# Patient Record
(demographics unavailable — no encounter records)

---

## 2017-01-22 NOTE — ER DOCUMENT REPORT
ED General





- General


Chief Complaint: High Blood Pressure


Stated Complaint: POSSIBLE HIGH BLOOD PRESSURE


Time seen by provider: 01:44


Notes: 


Patient is a 52-year-old male that comes emergency department for chief 

complaint of elevated blood pressure.  Patient states that his stomach upset, 

he vomited, he felt unwell, he checked his blood pressure noticed it was about 

170/100.  He states became concerned and came emergency department.  He denies 

any current symptoms including headache, chest pain, abdominal pain.  He states 

he feels fine and wants go home.  Patient states that he only got sick to the 

stomach after he took the for antibiotics that he is on at the same time.  

Patient is on antibiotics for his left foot which he is about to begin 

hyperbaric treatments for.  Patient denies any fevers or chills.


TRAVEL OUTSIDE OF THE [place] IN LAST 30 DAYS: No





- Related Data


Allergies/Adverse Reactions: 


 





No Known Allergies Allergy (Verified 01/21/17 23:40)


 











Past Medical History





- General


Information source: Patient





- Social History


Smoking Status: Current Every Day Smoker


Frequency of alcohol use: None


Drug Abuse: None


Lives with: Family


Family History: Reviewed & Not Pertinent, CAD, COPD, DM


Patient has suicidal ideation: No


Patient has homicidal ideation: No





- Past Medical History


Cardiac Medical History: Reports: Hx Hypertension


Endocrine Medical History: Reports: Hx Diabetes Mellitus Type 2 - He has 

diabetic retinopathy and nephropathy.  There is proteinuria


Renal/ Medical History: Denies: Hx Peritoneal Dialysis


Psychiatric Medical History: 


   Denies: Hx Depression





- Immunizations


Hx Diphtheria, Pertussis, Tetanus Vaccination: Yes - unkown of date





Review of Systems





- Review of Systems


Constitutional: No symptoms reported


EENT: No symptoms reported


Cardiovascular: See HPI


Respiratory: No symptoms reported


Gastrointestinal: See HPI


Genitourinary: No symptoms reported


Male Genitourinary: No symptoms reported


Musculoskeletal: No symptoms reported


Skin: No symptoms reported


Hematologic/Lymphatic: No symptoms reported


Neurological/Psychological: No symptoms reported





Physical Exam





- Vital signs


Vitals: 


 











Temp Pulse Resp BP Pulse Ox


 


 97.9 F   91   16   158/79 H  98 


 


 01/21/17 23:37  01/21/17 23:37  01/21/17 23:37  01/21/17 23:37  01/21/17 23:37











Interpretation: Normal





- General


General appearance: Appears well, Alert


In distress: None





- HEENT


Head: Normocephalic, Atraumatic


Eyes: Normal


Pupils: PERRL





- Respiratory


Respiratory status: No respiratory distress


Chest status: Nontender


Breath sounds: Normal.  No: Decreased air movement, Wheezing


Chest palpation: Normal





- Cardiovascular


Rhythm: Regular.  No: Tachycardia


Heart sounds: Normal auscultation, S1 appreciated, S2 appreciated


Murmur: No





- Abdominal


Inspection: Normal


Distension: No distension


Bowel sounds: Normal


Tenderness: Nontender


Organomegaly: No organomegaly





- Back


Back: Normal, Nontender





- Extremities


General upper extremity: Normal inspection, Nontender, Normal color, Normal ROM

, Normal temperature


General lower extremity: Other - Patient has a walking boot on his left foot.  

Otherwise unremarkable exam





- Neurological


Neuro grossly intact: Yes


Cognition: Normal


Orientation: AAOx4


[place] Coma Scale Eye Opening: Spontaneous


[place] Coma Scale Verbal: Oriented


[place] Coma Scale Motor: Obeys Commands


[place] Coma Scale Total: 15


Speech: Normal


Motor strength normal: LUE, RUE, LLE, RLE


Sensory: Normal





- Psychological


Associated symptoms: Normal affect, Normal mood





- Skin


Skin Temperature: Warm


Skin Moisture: Dry


Skin Color: Normal





Course





- Re-evaluation


Re-evalutation: 


Patient with asymptomatic hypertension, very well appearing, patient is 

declining any additional evaluation regardless.  No emergent evaluation needed, 

patient is to continue his daily medications and follow-up closely with his 

primary care provider.  Discussed return precautions.  Patient states 

understanding and agreement.





- Vital Signs


Vital signs: 


 











Temp Pulse Resp BP Pulse Ox


 


 97.9 F   83   16   149/81 H  98 


 


 01/21/17 23:37  01/22/17 01:58  01/21/17 23:37  01/22/17 01:58  01/21/17 23:37














Discharge





- Discharge


Clinical Impression: 


 Essential hypertension, Non healing left heel wound





Vomiting


Qualifiers:


 Vomiting type: unspecified Vomiting Intractability: non-intractable Nausea 

presence: with nausea Qualified Code(s): R11.2 - Nausea with vomiting, 

unspecified





Condition: Stable


Disposition: HOME, SELF-CARE


Additional Instructions: 


Continue your antibiotics.


Take your blood pressure medication as prescribed.


Follow-up with your primary care provider.


Return to the emergency department for any concerning symptoms - chest pain, 

numbness or weakness, dizziness, etc.


Forms:  Return to Work, Elevated Blood Pressure


Referrals: 


CATHI FARMER MD [Primary Care Provider] - Follow up as needed

## 2017-01-29 NOTE — ER DOCUMENT REPORT
ED Extremity Problem, Lower





- General


Chief Complaint: Other


Stated Complaint: LEFT FOOT/CAST PROBLEM


Time seen by provider: 22:46


Mode of Arrival: Ambulatory


Information source: Patient


TRAVEL OUTSIDE OF THE [place] IN LAST 30 DAYS: No





- HPI


Patient complains to provider of: Other - Requesting cast removal


Location: Foot


Occurred: Just prior to arrival


Where: Home


Onset/Duration: Sudden


Quality of pain: Achy, Throbbing


Severity: Moderate


Pain Level: 2


Context: Recent immobilization


Recent injury: No


Exacerbated by: Nothing


Relieved by: Nothing


Notes: 


Patient is a 52-year-old male with history of a chronic nonhealing ulcer to his 

left heel, he is seen by the wound care clinic and recently had a cast placed 

on this foot, unfortunately he states he got it wet in the shower this evening 

and feels as though the cast tightening around his ankle and causing his toes 

to tingle, patient denies any other injury or pain





- Related Data


Allergies/Adverse Reactions: 


 





No Known Allergies Allergy (Verified 01/21/17 23:40)


 











Past Medical History





- General


Information source: Patient





- Social History


Smoking Status: Current Every Day Smoker


Chew tobacco use (# tins/day): No


Frequency of alcohol use: None


Drug Abuse: None


Family History: Reviewed & Not Pertinent, CAD, COPD, DM


Patient has suicidal ideation: No


Patient has homicidal ideation: No





- Past Medical History


Cardiac Medical History: Reports: Hx Hypertension


Endocrine Medical History: Reports: Hx Diabetes Mellitus Type 2 - He has 

diabetic retinopathy and nephropathy.  There is proteinuria


Renal/ Medical History: Denies: Hx Peritoneal Dialysis


Psychiatric Medical History: 


   Denies: Hx Depression





- Immunizations


Hx Diphtheria, Pertussis, Tetanus Vaccination: Yes - unkown of date





Review of Systems





- Review of Systems


Constitutional: No symptoms reported


EENT: No symptoms reported


Cardiovascular: No symptoms reported


Respiratory: No symptoms reported


Gastrointestinal: No symptoms reported


Genitourinary: No symptoms reported


Male Genitourinary: No symptoms reported


Musculoskeletal: See HPI


Skin: No symptoms reported


Hematologic/Lymphatic: No symptoms reported


Neurological/Psychological: No symptoms reported


-: Yes All other systems reviewed and negative





Physical Exam





- Vital signs


Vitals: 


 











Temp Pulse Resp BP Pulse Ox


 


 97.4 F   92   18   169/72 H  99 


 


 01/29/17 20:58  01/29/17 20:58  01/29/17 20:58  01/29/17 20:58  01/29/17 20:58











Interpretation: Normal





- Notes


Notes: 


- General


General appearance: Appears well, Alert


In distress: None





- HEENT


Head: Normocephalic, Atraumatic


Eyes: Normal


Conjunctiva: Normal


Extraocular movements intact: Yes


Eyelashes: Normal


Pupils: PERRL





- Respiratory


Respiratory status: No respiratory distress





- Cardiovascular


Rhythm: Regular





- Abdominal


Inspection: Normal





- Back


Back: Normal





- Extremities


General upper extremity: Normal inspection


General lower extremity: Cast in place on the left lower extremity which goes 

from just below the knee and covers the toes completely





- Neurological


Neuro grossly intact: Yes


Orientation: AAOx4


[place] Coma Scale Eye Opening: Spontaneous


[place] Coma Scale Verbal: Oriented


[place] Coma Scale Motor: Obeys Commands


[place] Coma Scale Total: 15





- Psychological


Associated symptoms: Normal affect, Normal mood





- Skin


Skin Temperature: Warm


Skin Moisture: Dry


Skin Color: Normal





Course





- Re-evaluation


Re-evalutation: 


01/29/17 22:46


Patient was discussed with podiatrist Dr. Smith from the wound care clinic, who 

is in agreement with removing patient's cast, he requested Xeroform and gauze 

dressing with Kerlix, have patient wear his postop shoe and follow-up at the 

wound care center in the morning





01/30/17 23:38


After cast was removed neurovascular evaluation was performed, patient has 

distal sensation and motor intact, brisk capillary refill, 2+ DP pulses, there 

is a large ulcer to the heel which was redressed, patient was advised to follow-

up at the wound care center in the morning, states he actually has an 

appointment for hyperbaric treatment





- Vital Signs


Vital signs: 


 











Temp Pulse Resp BP Pulse Ox


 


 97.6 F   84   18   162/97 H  98 


 


 01/29/17 23:24  01/29/17 23:24  01/29/17 23:24  01/29/17 23:24  01/29/17 23:24














Procedures





- Additional Procedures


  ** cast removal


Time performed: 23:04


Additional Procedures: Other - Cast on the left lower extremity was bivalved 

using the cast removal saw, removed without difficulty, it did appear to be 

quite wet inside





Discharge





- Discharge


Clinical Impression: 


 Cast removal





Condition: Stable


Disposition: HOME, SELF-CARE


Additional Instructions: 


Follow up with the wound care clinic first thing tomorrow morning.  Return if 

symptoms worsen or any additional concerns.

## 2017-01-29 NOTE — ER DOCUMENT REPORT
ED Medical Screen (RME)





- General


Stated Complaint: LEFT FOOT/CAST PROBLEM


Notes: 


Cast to left lower extremity Wet shower patient's provider advised to come to 

the emergency room for removal.


I greeted and performed a rapid initial assessment of this patient. 

Comprehensive ED assessment and evaluation of the patient, analysis of test 

results and completion of the medical decision making process will be conducted 

by additional ED providers.


TRAVEL OUTSIDE OF THE [place] IN LAST 30 DAYS: No





- Related Data


Allergies/Adverse Reactions: 


 





No Known Allergies Allergy (Verified 01/21/17 23:40)


 











Past Medical History





- Past Medical History


Cardiac Medical History: Reports: Hx Hypertension


Endocrine Medical History: Reports: Hx Diabetes Mellitus Type 2 - He has 

diabetic retinopathy and nephropathy.  There is proteinuria


Renal/ Medical History: Denies: Hx Peritoneal Dialysis


Psychiatric Medical History: 


   Denies: Hx Depression





- Immunizations


Hx Diphtheria, Pertussis, Tetanus Vaccination: Yes - unkown of date

## 2018-03-30 NOTE — RADIOLOGY REPORT (SQ)
EXAM DESCRIPTION:  TIBIA FIBULA LEFT



COMPLETED DATE/TIME:  3/30/2018 11:21 am



REASON FOR STUDY:  fall injury



COMPARISON:  None.



NUMBER OF VIEWS:  Two views.



TECHNIQUE:  Two radiographic images acquired of the left tibia and fibula to include the knee and ank
le in at least one projection.



LIMITATIONS:  None.



FINDINGS:  MINERALIZATION: Normal.

BONES: Bimalleolar fractures.  No proximal tibial or fibular fracture is seen.

SOFT TISSUES: No obvious swelling or foreign body.

OTHER: No other significant finding.



IMPRESSION:  Bimalleolar fractures.



TECHNICAL DOCUMENTATION:  JOB ID:  [id number]

 2011 [employer]- All Rights Reserved



Reading location - IP/workstation name: [email or web address]

## 2018-03-30 NOTE — RADIOLOGY REPORT (SQ)
EXAM DESCRIPTION:  FOOT LEFT COMPLETE



COMPLETED DATE/TIME:  3/30/2018 11:21 am



REASON FOR STUDY:  fall injury



COMPARISON:  None.



NUMBER OF VIEWS:  Three views.



TECHNIQUE:  AP, lateral and oblique  radiographic images acquired of the left foot.



LIMITATIONS:  None.



FINDINGS:  MINERALIZATION: Normal.

BONES: There is a fracture of the medial malleolus.  There is a fracture of the navicular in the sagi
ttal plane.

JOINTS: No effusions.

SOFT TISSUES: No soft tissue swelling.  No foreign body.

OTHER: No other significant finding.



IMPRESSION:  Medial malleolar and navicular fractures.



TECHNICAL DOCUMENTATION:  JOB ID:  [id number]

 2011 [employer]- All Rights Reserved



Reading location - IP/workstation name: [email or web address]

## 2018-03-30 NOTE — ER DOCUMENT REPORT
ED Extremity Problem, Lower





- General


Chief Complaint: Ankle Injury


Stated Complaint: LEFT ANKLE INJURY


Time Seen by Provider: 03/30/18 10:16


Mode of Arrival: Ambulatory


Information source: Patient


Notes: 





54-year-old male presents to ED for complaint of pain to the left ankle.  He 

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 

freezer.  He states he is unable to walk on his foot.  He is in a wheelchair 

not able to walk at this time.  He states it happened just before he came.  He 

is speaking in full sentences.


TRAVEL OUTSIDE OF THE [place] IN LAST 30 DAYS: No





- HPI


Patient complains to provider of: Injury, Pain, Swelling


Location: Ankle, Foot


Occurred: Just prior to arrival


Where: Work


Onset/Duration: Sudden, Persistent


Quality of pain: Sharp, Throbbing


Severity: Severe


Pain Level: 5


Context: Fell, Twisted


Recent injury: Yes


Associated symptoms: Painful ambulation


Exacerbated by: Hanging down, Movement, Walking


Relieved by: Nothing





- Related Data


Allergies/Adverse Reactions: 


 





No Known Allergies Allergy (Verified 03/30/18 10:00)


 











Past Medical History





- General


Information source: Patient





- Social History


Smoking Status: Current Every Day Smoker


Cigarette use (# per day): Yes - One half pack per day


Chew tobacco use (# tins/day): No


Smoking Education Provided: Yes - 4 minutes


Frequency of alcohol use: None


Drug Abuse: None


Occupation: [employer]


Lives with: Family


Family History: CAD, COPD, DM, Hyperlipidemia, Hypertension.  denies: Arthritis

, CVA, Malignancy, Thyroid Disfunction


Patient has suicidal ideation: No


Patient has homicidal ideation: No





- Past Medical History


Cardiac Medical History: Reports: Hx Hypertension


Pulmonary Medical History: Reports: None


EENT Medical History: Reports: None


Neurological Medical History: Reports: None


Endocrine Medical History: Reports: Hx Diabetes Mellitus Type 2 - He has 

diabetic retinopathy and nephropathy.  There is proteinuria


Renal/ Medical History: Reports: Hx Renal Insufficiency


Malignancy Medical History: Reports None


GI Medical History: Reports: None


Musculoskeltal Medical History: Reports None


Skin Medical History: Reports None


Psychiatric Medical History: Reports: None


Traumatic Medical History: Reports: None


Infectious Medical History: Reports: None


Past Surgical History: Reports: Other - Dates he had surgery on his left heel 

for a bruised a car to be cold and it





- Immunizations


Hx Diphtheria, Pertussis, Tetanus Vaccination: Yes - unkown of date





Review of Systems





- Review of Systems


Constitutional: No symptoms reported


EENT: No symptoms reported


Cardiovascular: No symptoms reported


Respiratory: No symptoms reported


Gastrointestinal: No symptoms reported


Genitourinary: No symptoms reported


Male Genitourinary: No symptoms reported


Musculoskeletal: Muscle pain, Muscle stiffness, Ankle swelling, Other - Foot 

pain and swelling


Skin: No symptoms reported


Hematologic/Lymphatic: No symptoms reported


Neurological/Psychological: No symptoms reported


-: Yes All other systems reviewed and negative





Physical Exam





- Vital signs


Vitals: 


 











Temp Pulse Resp BP Pulse Ox


 


 98.8 F   85   18   158/73 H  99 


 


 03/30/18 10:01  03/30/18 10:01  03/30/18 10:01  03/30/18 10:01  03/30/18 10:01











Interpretation: Normal





- General


General appearance: Appears well, Alert





- HEENT


Head: Normocephalic, Atraumatic


Eyes: Normal


Pupils: PERRL





- Respiratory


Respiratory status: No respiratory distress


Chest status: Nontender


Breath sounds: Normal


Chest palpation: Normal





- Cardiovascular


Rhythm: Regular


Heart sounds: Normal auscultation


Murmur: No





- Abdominal


Inspection: Normal


Distension: No distension


Bowel sounds: Normal


Tenderness: Nontender


Organomegaly: No organomegaly





- Back


Back: Normal, Nontender





- Extremities


General upper extremity: Normal inspection, Nontender, Normal color, Normal ROM

, Normal temperature


General lower extremity: Normal temperature.  No: Gloria's sign


Calf: Tender


Ankle: Tender, Ecchymosis, Edema, Limited ROM, Unable to bear weight.  No: 

Abrasion, Deformity, Instability, Laceration, Positive Valentin's test


Foot: Tender





- Neurological


Neuro grossly intact: Yes


Cognition: Normal


Orientation: AAOx4


[place] Coma Scale Eye Opening: Spontaneous


[place] Coma Scale Verbal: Oriented


[place] Coma Scale Motor: Obeys Commands


[place] Coma Scale Total: 15


Speech: Normal


Motor strength normal: LUE, RUE, LLE, RLE


Sensory: Normal





- Psychological


Associated symptoms: Normal affect, Normal mood





- Skin


Skin Temperature: Warm


Skin Moisture: Dry


Skin Color: Normal





Course





- Re-evaluation


Re-evalutation: 





03/30/18 21:26


Patient's ankle was swollen and bruised painful decreased range of motion.  X-

ray shows a bimalleolar fracture with navicular fracture.  Consulted 

piramzadian with plan to use posterior and stirrup splint no weightbearing and 

crutches.  Dr. jack agreed with this plan.  I then consulted Dr. Mendiola 

to let him know of the injury.  He stated to make sure the patient understood 

no weightbearing and have him call the office first thing Monday morning if he 

did not call the day to schedule a follow-up appointment.





- Vital Signs


Vital signs: 


 











Temp Pulse Resp BP Pulse Ox


 


 97.8 F   75   18   179/81 H  99 


 


 03/30/18 12:27  03/30/18 12:27 03/30/18 12:27 03/30/18 12:27 03/30/18 12:27














- Diagnostic Test


Radiology reviewed: Image reviewed, Reports reviewed





Discharge





- Discharge


Clinical Impression: 


Bimalleolar ankle fracture


Qualifiers:


 Encounter type: initial encounter Fracture type: closed Laterality: left 

Qualified Code(s): S82.842A - Displaced bimalleolar fracture of left lower leg, 

initial encounter for closed fracture





Navicular fracture of ankle


Qualifiers:


 Encounter type: initial encounter Fracture type: closed Fracture alignment: 

nondisplaced Laterality: right Qualified Code(s): S92.254A - Nondisplaced 

fracture of navicular [scaphoid] of right foot, initial encounter for closed 

fracture





Condition: Stable


Disposition: HOME, SELF-CARE


Additional Instructions: 


Fractured Ankle (Bimalleolar)





     You have a fracture of both bones of the lower leg at the ankle.  If there 

is dispacement of the bones from their proper alignment, manipulation of the 

ankle and foot may be necessary to re-align the bones properly.  This fracture 

wll require a cast for healing and some of the more serious fractures of this 

type will require surgery.  If surgery is not required, the bones requires only 

protection and sufficient time for healing.  The initial treatment is 

immobilization, elevation, and ice packs.  Depending on the type of fracture, 

immobilization may consist of a splint or cast.  The length of time required 

for healing depends on the type of fracture.  You will be referred to an 

orthopedic surgeon who will re-assess you periodically to make certain that the 

bone heals without complications.  It's important that you follow the 

instructions given you.





Fractured Navicular of the foot





     You have a fracture through the navicular bone of thefoot. This fracture 

is of special concern.  Failure to follow the doctor's instructions can result 

in permanent pain and stiffness.


     A splint or cast will be placed.  The first few days, the injury should be 

elevated and ice packed.  Healing usually takes about five or six weeks.


     Call the doctor or return at once if pain becomes severe, or if the hand 

becomes numb or swollen.





Splint Pending Casting





     Your injury can't be casted until the swelling has subsided. Therefore, a 

temporary splint has been placed to protect the injury.


     Full use of an injured area is not possible in a splint.  You should 

follow the doctor's instructions concerning rest, ice, and elevation of the 

injury.  Never do anything which causes pain under the splint.


     Keep the splint on ALL THE TIME until you return for casting.  If there is 

unexpected severe pain, or numbness, discoloration, or swelling beyond the 

splint, you should return at once.








There will be no weightbearing on this foot.  This means that this would is not 

to touch the ground until approved by your orthopedic doctor and needs that 

this splint should be perfectly clean when you go to the doctor.








USE OF CRUTCHES:


     The doctor has recommended that you not bear weight at this time. You will 

need to use crutches.  Adjust the crutches so the tops come to about two inches 

under the armpit while you are standing upright.  Use your hands -- not your 

armpits -- to support your weight.


     To get into a chair, support yourself with one crutch on the injured side.

  Hold the chair with the other hand, then lower yourself while putting all 

your weight on the good leg.


     Going up stairs is `good leg up, step up, then bring up crutches and bad 

leg.'  Down stairs is `bad leg and crutches down, then bring good leg down.'


     If you develop numbness or swelling in an arm or hand, you are using the 

crutches incorrectly.  Return if you are having any problems with the crutches.








ICE & ELEVATION:


     Apply ice packs frequently against the painful area.  Many different 

schedules are recommended, such as "20 minutes on, 20 minutes off" or "one hour 

ice, two hours rest."  If you need to work, you may need to go longer between 

ice treatments.  You should plan to have the area ice packed AT LEAST one-

fourth of the time.


     The ice should be applied over the wrap, tape, or splint, or over a layer 

of cloth -- not directly against the skin.  Some ice bags have a built-in cloth 

and can be put directly on the skin.


     Your injured part should be elevated as much as possible over the next 48 

hours.  Try to keep the injury above the level of the heart. Avoid use of the 

injured area.  Elevation and rest will decrease the swelling.








USE OF OVER-THE-COUNTER IBUPROFEN:


     Ibuprofen (Advil, Nuprin, Medipren, Motrin IB) is a medication for fever 

and pain control.  In addition, it has anti- inflammatory effects which may be 

beneficial, especially in the treatment of injuries.


     It's best to take ibuprofen with food.  Persons with ulcer disease or 

allergy to aspirin should notify their physician of this before taking 

ibuprofen.


     Ibuprofen can be given every four to six hours, for a total of four doses 

daily.


     Age              Pain or fever dose          Antiinflammatory dose


     6-8 yr              200 mg (1 tab)                200 mg (1 tab)


     9-11 yr             200 mg (1 tab)                200-400 mg (1-2 tab)


     11-14 yr            200-400 mg (1-2 tab)         400 mg (2 tab)


     15-adult            400 mg (2 tab)                600 mg (3 tab)








ORAL NARCOTIC MEDICATION:


     You have been given a prescription for pain control.  This medication is a 

narcotic.  It's best taken with food, as nausea can result if taken on an empty 

stomach.


     Don't operate machinery or drive within six hours of taking this 

medication.  Do not combine this medicine with alcohol, or with any medication 

which can cause sedation (such as cold tablets or sleeping pills) unless you 

get permission from the physician.


     Narcotics tend to cause constipation.  If possible, drink plenty of fluids 

and eat a diet high in fiber and fruits.





     Please be aware that prescription narcotics also have the potential for 

abuse.  People become addicted to these medications because of the general 

sense of wellbeing that they induce.  This feeling along with a significant 

reduction in tension, anxiety, and aggression provides a stimulating seductive 

quality to these drugs.  Once your pain is under control, we encourage you to 

discard your unused narcotics.








FOLLOW-UP CARE:


If you have been referred to a physician for follow-up care, call the physician

s office for an appointment as you were instructed or within the next two days.

  If you experience worsening or a significant change in your symptoms, notify 

the physician immediately or return to the Emergency Department at any time for 

re-evaluation.


Prescriptions: 


Oxycodone HCl/Acetaminophen [Percocet 5-325 mg Tablet] 1 tab PO Q6HP PRN #15 

tablet


 PRN Reason: For Pain Scale 4-5


Forms:  Elevated Blood Pressure, Smoking Cessation Education, Return to Work


Referrals: 


CATHI FARMER MD [Primary Care Provider] - Follow up as needed


BRENNEN MENDIOLA DO [ACTIVE STAFF] - 03/30/18

## 2018-04-08 NOTE — ER DOCUMENT REPORT
ED Fall





- General


Chief Complaint: Hip Pain


Stated Complaint: FALL HIP AND SHOUKLDER PAIN


Time Seen by Provider: 04/08/18 14:23


Mode of Arrival: Ambulatory


Information source: Patient


TRAVEL OUTSIDE OF THE [place] IN LAST 30 DAYS: No





- HPI


Occurred: Just prior to arrival


Where: Home


Context: Lost balance - WHILE ATTEMPTING TO AMBULATE, W/ CRUTCHES


Associated symptoms: None.  denies: Lost consciousness, Became dizzy/fainted


Location of injury/pain: Hip - LEFT, Shoulder - LEFT


Quality of pain: Sharp


Severity: Moderate


Notes: 





FELL & FRACTURED L. ANKLE 3/30, ORIF PLANNED FOR 4/12.





- Related data


Allergies/Adverse Reactions: 


 





No Known Allergies Allergy (Verified 03/30/18 10:00)


 








Home Medications: lisinopril.  novolog.  clonodine.  lasix





Past Medical History





- General


Information source: Patient





- Social History


Smoking Status: Current Every Day Smoker


Chew tobacco use (# tins/day): No


Smoking Education Provided: No


Frequency of alcohol use: Occasional


Drug Abuse: None


Lives with: Family


Family History: CAD, COPD, DM, Hyperlipidemia, Hypertension.  denies: Arthritis

, CVA, Malignancy, Thyroid Disfunction


Patient has suicidal ideation: No


Patient has homicidal ideation: No





- Past Medical History


Cardiac Medical History: Reports: Hx Hypertension


   Denies: Hx Heart Attack


Pulmonary Medical History: 


   Denies: Hx Asthma


Neurological Medical History: Denies: Hx Seizures


Endocrine Medical History: Reports: Hx Diabetes Mellitus Type 2 - He has 

diabetic retinopathy and nephropathy.  There is proteinuria


Renal/ Medical History: Reports: Hx Renal Insufficiency.  Denies: Hx 

Peritoneal Dialysis


GI Medical History: Denies: Hx Hiatal Hernia, Hx Ulcer


Past Surgical History: Reports: Other - Dates he had surgery on his left heel 

for a bruised a car to be cold and it.  Denies: Hx Open Heart Surgery





- Immunizations


Hx Diphtheria, Pertussis, Tetanus Vaccination: Yes - unkown of date





Review of Systems





- Review of Systems


Constitutional: No symptoms reported


EENT: No symptoms reported


Cardiovascular: No symptoms reported


Respiratory: No symptoms reported


Gastrointestinal: No symptoms reported


Musculoskeletal: See HPI


Skin: No symptoms reported


Neurological/Psychological: Numbness - L. HAND





Physical Exam





- Vital signs


Vitals: 


 











Temp Pulse Resp BP Pulse Ox


 


 98.2 F   76   18   159/72 H  99 


 


 04/08/18 14:09  04/08/18 14:09  04/08/18 14:09  04/08/18 14:09  04/08/18 14:09











Interpretation: Hypertensive.  No: Tachycardic, Tachypneic, Febrile





- General


General appearance: Appears well, Alert


In distress: None





- HEENT


Head: Normocephalic


Eyes: Normal


Ears: Normal


Nasal: Normal


Mouth/Lips: Normal


Mucous membranes: Normal





- Respiratory


Respiratory status: No respiratory distress





- Cardiovascular


Rhythm: Regular





- Abdominal


Inspection: Normal


Distension: No distension





- Extremities


General upper extremity: Normal inspection, Tender - L. SHOULDER, Normal color.

  No: Normal ROM


General lower extremity: Tender - L. HIP.  No: Normal inspection, Normal ROM, 

Normal weight bearing


Ankle: Other - IN STIRRUP & POSTERIOR SPLINT





- Neurological


Neuro grossly intact: No - DECREASED SENSATION TO LIGHT TOUCH, L. HAND


Cognition: Normal


Orientation: AAOx4


[place] Coma Scale Eye Opening: Spontaneous


[place] Coma Scale Verbal: Oriented


[place] Coma Scale Motor: Obeys Commands


[place] Coma Scale Total: 15





- Psychological


Associated symptoms: Normal affect, Normal mood





- Skin


Skin Temperature: Warm


Skin Moisture: Dry


Skin Color: Normal


Skin Turgor: Elastic





Course





- Vital Signs


Vital signs: 


 











Temp Pulse Resp BP Pulse Ox


 


 98.2 F   76   18   159/72 H  99 


 


 04/08/18 14:09  04/08/18 14:09  04/08/18 14:09  04/08/18 14:09  04/08/18 14:09














- Consults


  ** DR. RICHARDS


Time consulted: 15:55


Reason for consultation: 





04/08/18 16:10


AGREES TO CONSULT.


Consulted provider: will see as inpatient





  ** DR. MARADIAGA


Time consulted: 16:02


Reason for consultation: 





04/08/18 16:11


AGREES TO ADMIT FOR DR. FARMER


Consulted provider: will see as inpatient





Discharge





- Discharge


Clinical Impression: 


 Type 2 diabetes mellitus





Hip fracture, left


Qualifiers:


 Encounter type: initial encounter Fracture type: closed Qualified Code(s): 

S72.002A - Fracture of unspecified part of neck of left femur, initial 

encounter for closed fracture





Fracture, humerus, proximal


Qualifiers:


 Encounter type: initial encounter Fracture type: closed Fracture morphology: 

other fracture Fracture alignment: nondisplaced Laterality: left Qualified Code(

s): S42.295A - Other nondisplaced fracture of upper end of left humerus, 

initial encounter for closed fracture





Condition: Good


Disposition: ADMITTED AS INPATIENT


Admitting Provider: Karel


Unit Admitted: Medical Floor


Referrals: 


CATHI FARMER MD [Primary Care Provider] - Follow up as needed

## 2018-04-08 NOTE — RADIOLOGY REPORT (SQ)
EXAM DESCRIPTION:  HIP LEFT AP/LATERAL



COMPLETED DATE/TIME:  4/8/2018 3:27 pm



REASON FOR STUDY:  FALL, TRAUMA, PAIN



COMPARISON:  None.



NUMBER OF VIEWS:  Two views.



TECHNIQUE:  AP pelvis and additional frog-leg view of the left hip.



LIMITATIONS:  None.



FINDINGS:  MINERALIZATION: Normal.

LEFT HIP: Nondisplaced fracture of the left femoral neck.

RIGHT HIP: No fracture or dislocation.  No worrisome bone lesions.

PUBIS AND ISCHIUM: No fracture.

PELVIS: No fracture.

SACRUM: No fracture or dislocation. No worrisome bone lesions.

LOWER LUMBAR SPINE: No fracture or dislocation. No worrisome bone lesions.  No significant disc disea
se.

SOFT TISSUES: No findings.

OTHER: No other significant finding.



IMPRESSION:  Nondisplaced fracture of the left femoral neck.



TECHNICAL DOCUMENTATION:  JOB ID:  [id number]

[id number]

 2011 [employer]- All Rights Reserved



Reading location - IP/workstation name: [email or web address]

## 2018-04-08 NOTE — RADIOLOGY REPORT (SQ)
EXAM DESCRIPTION:  SHOULDER LEFT 2 OR MORE VIEWS



COMPLETED DATE/TIME:  4/8/2018 3:27 pm



REASON FOR STUDY:  FALL, TRAUMA, PAIN



COMPARISON:  None.



NUMBER OF VIEWS:  Three views.



TECHNIQUE:  Internal rotation, external rotation, and Y view images acquired of the left shoulder.



LIMITATIONS:  None.



FINDINGS:  MINERALIZATION: Normal.

BONES: Fracture the proximal humerus, possibly involving the humeral neck as well as the greater tube
rosity.

JOINTS: No dislocation.

VISUALIZED LUNGS AND RIBS: No pneumothorax.  No rib fracture.

SOFT TISSUES: No radiopaque foreign body.

OTHER: No other significant finding.



IMPRESSION:  Fracture the proximal humerus, possibly involving the humeral neck as well as the greate
r tuberosity.



TECHNICAL DOCUMENTATION:  JOB ID:  [id number]

[id number]

 2011 [employer]- All Rights Reserved



Reading location - IP/workstation name: [email or web address]

## 2018-04-09 NOTE — RADIOLOGY REPORT (SQ)
EXAM DESCRIPTION:  NO CHG FLUORO; HIP IN OPERATING RM



COMPLETED DATE/TIME:  4/9/2018 3:35 pm



REASON FOR STUDY:  LT HIP PINNING ASST WITH FLUORO IN OR



COMPARISON:  Preoperative radiographs.



FLUOROSCOPY TIME:  0.4 minutes

3 images saved to PACS.



TECHNIQUE:  Intra-operative images acquired during surgical procedure to evaluate progress.

NUMBER OF IMAGES: 3



LIMITATIONS:  None.



FINDINGS:  Images reveal open reduction internal fixation of proximal femur fracture, grossly anatomi
c alignment.



IMPRESSION:  IMAGE(S) OBTAINED DURING PROCEDURE.



COMMENT:  Quality :  Final reports for procedures using fluoroscopy that document radiation exp
osure indices, or exposure time and number of fluorographic images (if radiation exposure indices are
 not available)

Please consult full operative report of the attending physician for description of the procedure.



TECHNICAL DOCUMENTATION:  JOB ID:  [id number]

 2011 [employer]- All Rights Reserved



Reading location - IP/workstation name: [email or web address]

## 2018-04-09 NOTE — RADIOLOGY REPORT (SQ)
EXAM DESCRIPTION:  NO CHG FLUORO; ANKLE LEFT AP/LATERAL



COMPLETED DATE/TIME:  4/9/2018 3:35 pm



REASON FOR STUDY:  ORIF LEFT ANKLE ASST WITH FLUORO IN OR



COMPARISON:  Preoperative radiographs.



FLUOROSCOPY TIME:  0.3 minutes

3 images saved to PACS.



TECHNIQUE:  Intra-operative images acquired during surgical procedure to evaluate progress.

NUMBER OF IMAGES: 3



LIMITATIONS:  None.



FINDINGS:  Images reveal open reduction internal fixation of medial and lateral ankle fractures with 
grossly anatomic alignment.



IMPRESSION:  IMAGE(S) OBTAINED DURING PROCEDURE.



COMMENT:  Quality :  Final reports for procedures using fluoroscopy that document radiation exp
osure indices, or exposure time and number of fluorographic images (if radiation exposure indices are
 not available)

Please consult full operative report of the attending physician for description of the procedure.



TECHNICAL DOCUMENTATION:  JOB ID:  [id number]

 2011 [employer]- All Rights Reserved



Reading location - IP/workstation name: [email or web address]
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
## 2018-04-09 NOTE — RADIOLOGY REPORT (SQ)
EXAM DESCRIPTION:  NO CHG FLUORO; HIP IN OPERATING RM



COMPLETED DATE/TIME:  4/9/2018 3:35 pm



REASON FOR STUDY:  LT HIP PINNING ASST WITH FLUORO IN OR



COMPARISON:  Preoperative radiographs.



FLUOROSCOPY TIME:  0.4 minutes

3 images saved to PACS.



TECHNIQUE:  Intra-operative images acquired during surgical procedure to evaluate progress.

NUMBER OF IMAGES: 3



LIMITATIONS:  None.



FINDINGS:  Images reveal open reduction internal fixation of proximal femur fracture, grossly anatomi
c alignment.



IMPRESSION:  IMAGE(S) OBTAINED DURING PROCEDURE.



COMMENT:  Quality :  Final reports for procedures using fluoroscopy that document radiation exp
osure indices, or exposure time and number of fluorographic images (if radiation exposure indices are
 not available)

Please consult full operative report of the attending physician for description of the procedure.



TECHNICAL DOCUMENTATION:  JOB ID:  [id number]

 2011 [employer]- All Rights Reserved



Reading location - IP/workstation name: [email or web address]

## 2018-04-09 NOTE — OPERATIVE REPORT
Operative Report


DATE OF SURGERY: 04/09/18


PREOPERATIVE DIAGNOSIS: Left valgus impacted femoral neck fracture.  Left 

proximal humeral fracture.  Left bimalleolar ankle fracture


OPERATION: Percutaneous fixation left femoral neck fracture.  Open reduction 

internal fixation left bimalleolar ankle fracture.  Open reduction internal 

fixation left proximal humerus fracture


SURGEON: EFE RICHARDS


ANESTHESIA: GA


PROCEDURE: 





With the patient supine on the fracture table the left lower extremities 

prepped and draped in sterile fashion.  Under fluoroscopic guidance the pins 

for the Gonzalez 6.5 titanium cannulated screws were placed percutaneously into 

the femoral neck and head.  A triangle type pattern is formed with one pin 

inferior and then one anterior and posterior.  Pin depth measures 90 mm.  3 x 

90 mm cannulated screws were then placed over the pins.  The pins are 

withdrawn.  X-rays were obtained in both planes to assure satisfactory hardware 

placement.  The wound was then irrigated and closed in layers with interrupted 

Vicryl followed by staples.  Sterile dressings applied and the patient was then 

transferred to a regular bed for next 2 stages of his treatment











The patient was next transferred to a regular operating room table in the left 

lower extremities prepped and draped in sterile fashion.  The limb was elevated 

for exsanguination tourniquet inflated 280 torr.  A longitudinal incision is 

made over the distal fibula and sharp dissection was carried incision through 

the periosteum.  The fracture is visualized.  Its distracted and then held in 

anatomic position with a lobster claw clamp.  Subsequently a Newton titanium 

distal fibula plate is applied and secured with 3 screws proximally and 4 

screws distally.  Fracture position and hardware placement are ideal.  Next a 

longitudinal incision was made over the medial malleolus.  This is a relatively 

small fragment this anterior as well as medial.  It is reduced with a dental 

pick and then secured with a single 4 oh cannulated screw.  The wounds are 

irrigated and closed using interrupted Vicryl followed by staples.  A sterile 

dressing is applied and attention is now going to be turned to the left 

shoulder.





The patient's position is now changed to a beachchair position on the operating 

table.  The left upper extremity forequarter prepped and draped in sterile 

fashion.  A standard deltopectoral approach to the proximal humerus is taken.  

The fracture was exposed and reduced.  A Newton titanium proximal humeral 

plate is applied just lateral to the biceps tendon.  It secured with 3 screws 

distally and 3 screws proximally.  When the hardware fixation is finished 

uroscopy is used to assess fracture reduction as well as hardware placement.  

At this point is noticed that the posterior superior screw was slightly long 

and protruding from the back of the humeral head.  An attempt is made to remove 

this using several screwdrivers as well as a screw removal set all of which are 

unsuccessful because the head of the screw becomes stripped.  At that point 

decision was made whether or not to proceed removal of all hardware and attempt 

to remove this protruding screw.  It is not clear that this is going to 

represent a clinical problem at this point.  There is certainly full passive 

range of motion without any impingement that I can perceive.  In light of this 

a decision was made to leave the screw in situ.  The wound is irrigated and 

closed is using Vicryl and staples.  A sterile compressive dressing and a 

shoulder immobilizer applied.  The patient's return to the PACU.





I discussed the situation with the patient's wife and explained that there is a 

screw that is too long and that I am not sure that it is going to be a clinical 

problem.  If it turns out to be a clinical problem we can with her posterior 

approach and remove the protruding part of the screw.
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
## 2018-04-09 NOTE — RADIOLOGY REPORT (SQ)
EXAM DESCRIPTION:  NO CHG FLUORO; SHOULDER LEFT 2 OR MORE VIEWS



COMPLETED DATE/TIME:  4/9/2018 3:35 pm



REASON FOR STUDY:  ORIF LEFT SHOULDER ASST WITH FLUORO IN OR



COMPARISON:  Preoperative radiographs.



FLUOROSCOPY TIME:  0.3 minutes

3 images saved to PACS.



TECHNIQUE:  Intra-operative images acquired during surgical procedure to evaluate progress.

NUMBER OF IMAGES: 3



LIMITATIONS:  None.



FINDINGS:  Images reveal open reduction internal fixation of proximal humerus fracture.  Grossly wellington
omic alignment.



IMPRESSION:  IMAGE(S) OBTAINED DURING PROCEDURE.



COMMENT:  Quality :  Final reports for procedures using fluoroscopy that document radiation exp
osure indices, or exposure time and number of fluorographic images (if radiation exposure indices are
 not available)

Please consult full operative report of the attending physician for description of the procedure.



TECHNICAL DOCUMENTATION:  JOB ID:  [id number]

 2011 [employer]- All Rights Reserved



Reading location - IP/workstation name: [email or web address]

## 2018-04-09 NOTE — RADIOLOGY REPORT (SQ)
EXAM DESCRIPTION:  NO CHG FLUORO; SHOULDER LEFT 2 OR MORE VIEWS



COMPLETED DATE/TIME:  4/9/2018 3:35 pm



REASON FOR STUDY:  ORIF LEFT SHOULDER ASST WITH FLUORO IN OR



COMPARISON:  Preoperative radiographs.



FLUOROSCOPY TIME:  0.3 minutes

3 images saved to PACS.



TECHNIQUE:  Intra-operative images acquired during surgical procedure to evaluate progress.

NUMBER OF IMAGES: 3



LIMITATIONS:  None.



FINDINGS:  Images reveal open reduction internal fixation of proximal humerus fracture.  Grossly wellington
omic alignment.



IMPRESSION:  IMAGE(S) OBTAINED DURING PROCEDURE.



COMMENT:  Quality :  Final reports for procedures using fluoroscopy that document radiation exp
osure indices, or exposure time and number of fluorographic images (if radiation exposure indices are
 not available)

Please consult full operative report of the attending physician for description of the procedure.



TECHNICAL DOCUMENTATION:  JOB ID:  [id number]

 2011 [employer]- All Rights Reserved



Reading location - IP/workstation name: [email or web address]

## 2018-04-09 NOTE — RADIOLOGY REPORT (SQ)
EXAM DESCRIPTION:  NO CHG FLUORO; ANKLE LEFT AP/LATERAL



COMPLETED DATE/TIME:  4/9/2018 3:35 pm



REASON FOR STUDY:  ORIF LEFT ANKLE ASST WITH FLUORO IN OR



COMPARISON:  Preoperative radiographs.



FLUOROSCOPY TIME:  0.3 minutes

3 images saved to PACS.



TECHNIQUE:  Intra-operative images acquired during surgical procedure to evaluate progress.

NUMBER OF IMAGES: 3



LIMITATIONS:  None.



FINDINGS:  Images reveal open reduction internal fixation of medial and lateral ankle fractures with 
grossly anatomic alignment.



IMPRESSION:  IMAGE(S) OBTAINED DURING PROCEDURE.



COMMENT:  Quality :  Final reports for procedures using fluoroscopy that document radiation exp
osure indices, or exposure time and number of fluorographic images (if radiation exposure indices are
 not available)

Please consult full operative report of the attending physician for description of the procedure.



TECHNICAL DOCUMENTATION:  JOB ID:  [id number]

 2011 [employer]- All Rights Reserved



Reading location - IP/workstation name: [email or web address]

## 2018-04-10 NOTE — PDOC PROGRESS REPORT
Subjective


Progress Note for:: 04/10/18


Reason For Visit: 


LEFT HIP FRACTURE


54-year-old male status post open reduction of left shoulder, left hip, and 

left ankle yesterday.  Patient comfortable overnight.





Physical Exam


Vital Signs: 


 











Temp Pulse Resp BP Pulse Ox


 


 37.0 C   92   18   182/78 H  97 


 


 04/10/18 04:12  04/10/18 04:12  04/09/18 23:31  04/10/18 04:12  04/10/18 04:12








 Intake & Output











 04/09/18 04/10/18 04/11/18





 06:59 06:59 06:59


 


Intake Total 1422 7740 


 


Output Total 1100 1800 


 


Balance 322 5940 


 


Weight 75.9 kg  











General appearance: PRESENT: no acute distress


Head exam: PRESENT: normocephalic


Respiratory exam: PRESENT: unlabored


Cardiovascular exam: PRESENT: RRR


Vascular exam: PRESENT: normal capillary refill


GI/Abdominal exam: PRESENT: soft


Rectal exam: PRESENT: deferred


Extremities exam: PRESENT: other - All 3 dressings are clean dry and intact.  

Distal neurovascular examination is intact to the left upper left lower 

extremity.


Neurological exam: PRESENT: alert, awake, oriented to person, oriented to place

, oriented to time, oriented to situation.  ABSENT: motor sensory deficit


Psychiatric exam: PRESENT: appropriate affect, normal mood.  ABSENT: homicidal 

ideation, suicidal ideation


Skin exam: PRESENT: dry, intact, warm.  ABSENT: cyanosis, rash





Results


Laboratory Results: 


 





 04/10/18 06:02 





 04/10/18 06:02 





 











  04/09/18 04/10/18 04/10/18





  12:16 06:02 06:02


 


WBC   6.7 


 


RBC   2.66 L 


 


Hgb   8.3 L 


 


Hct   23.9 L 


 


MCV   90 


 


MCH   31.2 


 


MCHC   34.7 


 


RDW   13.6 


 


Plt Count   172 


 


Seg Neutrophils %   82.3 H 


 


Lymphocytes %   9.3 L 


 


Monocytes %   8.0 


 


Eosinophils %   0.2 


 


Basophils %   0.2 


 


Absolute Neutrophils   5.5 


 


Absolute Lymphocytes   0.6 


 


Absolute Monocytes   0.5 


 


Absolute Eosinophils   0.0 


 


Absolute Basophils   0.0 


 


Sodium    139.2


 


Potassium    4.8


 


Chloride    109 H


 


Carbon Dioxide    20 L


 


Anion Gap    10


 


BUN    40 H


 


Creatinine    2.79 H


 


Est GFR ( Amer)    29 L


 


Est GFR (Non-Af Amer)    24 L


 


Glucose    140 H


 


Calcium    8.5


 


Blood Type  O POSITIVE  


 


Antibody Screen  NEGATIVE  











Impressions: 


 





Ankle X-Ray  04/09/18 00:00


IMPRESSION:  IMAGE(S) OBTAINED DURING PROCEDURE.


 








Fluoroscopy  04/09/18 00:00


IMPRESSION:  IMAGE(S) OBTAINED DURING PROCEDURE.


 








Hip X-Ray  04/09/18 00:00


IMPRESSION:  IMAGE(S) OBTAINED DURING PROCEDURE.


 








Shoulder X-Ray  04/09/18 00:00


IMPRESSION:  IMAGE(S) OBTAINED DURING PROCEDURE.


 











Status: Imported from PACS





Assessment & Plan





- Diagnosis


(1) Bimalleolar fracture of right ankle


Is this a current diagnosis for this admission?: Yes   


Plan: 


Postop day 1 status post ORIF








(2) Fracture, humerus, proximal


Qualifiers: 


   Encounter type: initial encounter   Fracture type: closed   Fracture 

morphology: other fracture   Fracture alignment: nondisplaced   Laterality: 

left   Qualified Code(s): S42.295A - Other nondisplaced fracture of upper end 

of left humerus, initial encounter for closed fracture   


Is this a current diagnosis for this admission?: Yes   


Plan: 


Postop day 1 status post ORIF








(3) Hip fracture, left


Qualifiers: 


   Encounter type: initial encounter   Fracture type: closed   Qualified Code(s)

: S72.002A - Fracture of unspecified part of neck of left femur, initial 

encounter for closed fracture   


Is this a current diagnosis for this admission?: Yes   


Plan: 


Postop day 1 status post ORIF.  Patient be mobilized with physical therapy and 

a touchdown weightbearing restriction for the left lower extremity.








- Time


Time Spent with patient: 15-24 minutes


Anticipated discharge: SNF


Within: Other

## 2018-04-10 NOTE — PDOC PROGRESS REPORT
Subjective


Progress Note for:: 04/10/18


Subjective:: 





He complained of nausea and vomiting most likely this is related to the 

medication, morphine, he is otherwise stable


Reason For Visit: 


LEFT HIP FRACTURE








Physical Exam


Vital Signs: 


 











Temp Pulse Resp BP Pulse Ox


 


 99 F   98   20   178/76 H  92 


 


 04/10/18 13:00  04/10/18 14:00  04/10/18 13:00  04/10/18 13:00  04/10/18 13:00








 Intake & Output











 04/09/18 04/10/18 04/11/18





 06:59 06:59 06:59


 


Intake Total 1422 7890 480


 


Output Total 1100 2250 425


 


Balance 322 5640 55


 


Weight 75.9 kg  











General appearance: PRESENT: no acute distress


Eye exam: PRESENT: PERRLA


Respiratory exam: PRESENT: decreased breath sounds


Cardiovascular exam: PRESENT: +S1, +S2


GI/Abdominal exam: PRESENT: soft


Neurological exam: PRESENT: alert





Results


Laboratory Results: 


 





 04/10/18 06:02 





 04/10/18 06:02 





 











  04/10/18 04/10/18





  06:02 06:02


 


WBC  6.7 


 


RBC  2.66 L 


 


Hgb  8.3 L 


 


Hct  23.9 L 


 


MCV  90 


 


MCH  31.2 


 


MCHC  34.7 


 


RDW  13.6 


 


Plt Count  172 


 


Seg Neutrophils %  82.3 H 


 


Lymphocytes %  9.3 L 


 


Monocytes %  8.0 


 


Eosinophils %  0.2 


 


Basophils %  0.2 


 


Absolute Neutrophils  5.5 


 


Absolute Lymphocytes  0.6 


 


Absolute Monocytes  0.5 


 


Absolute Eosinophils  0.0 


 


Absolute Basophils  0.0 


 


Sodium   139.2


 


Potassium   4.8


 


Chloride   109 H


 


Carbon Dioxide   20 L


 


Anion Gap   10


 


BUN   40 H


 


Creatinine   2.79 H


 


Est GFR ( Amer)   29 L


 


Est GFR (Non-Af Amer)   24 L


 


Glucose   140 H


 


Calcium   8.5











Impressions: 


 





Ankle X-Ray  04/09/18 00:00


IMPRESSION:  IMAGE(S) OBTAINED DURING PROCEDURE.


 








Fluoroscopy  04/09/18 00:00


IMPRESSION:  IMAGE(S) OBTAINED DURING PROCEDURE.


 








Hip X-Ray  04/09/18 00:00


IMPRESSION:  IMAGE(S) OBTAINED DURING PROCEDURE.


 








Shoulder X-Ray  04/09/18 00:00


IMPRESSION:  IMAGE(S) OBTAINED DURING PROCEDURE.


 














Assessment & Plan





- Diagnosis


(1) Bimalleolar fracture of left ankle


Qualifiers: 


   Encounter type: initial encounter   Fracture type: closed   Qualified Code(s)

: S82.842A - Displaced bimalleolar fracture of left lower leg, initial 

encounter for closed fracture   


Is this a current diagnosis for this admission?: Yes   





(2) Left humeral fracture


Qualifiers: 


   Encounter type: initial encounter   Humerus Location: proximal   Fracture 

type: closed   Fracture morphology: other fracture   Fracture alignment: 

nondisplaced   Qualified Code(s): S42.295A - Other nondisplaced fracture of 

upper end of left humerus, initial encounter for closed fracture   


Is this a current diagnosis for this admission?: Yes   





(3) Chronic kidney disease, stage 4 (severe)


Is this a current diagnosis for this admission?: Yes   





(4) Diabetes mellitus


Qualifiers: 


   Diabetes mellitus type: type 2   Diabetes mellitus long term insulin use: 

with long term use   Diabetes mellitus complication status: with ophthalmic 

complications   Diabetes mellitus complication detail: with diabetic 

retinopathy   Diabetic retinopathy severity: with severe nonproliferative 

retinopathy   Diabetes mellitus macular edema: with macular edema   Laterality: 

bilateral   Qualified Code(s): E11.3413 - Type 2 diabetes mellitus with severe 

nonproliferative diabetic retinopathy with macular edema, bilateral; Z79.4 - 

Long term (current) use of insulin; Z79.4 - Long term (current) use of insulin; 

Z79.4 - Long term (current) use of insulin; Z79.4 - Long term (current) use of 

insulin   


Is this a current diagnosis for this admission?: Yes   





(5) Hip fracture, left


Qualifiers: 


   Encounter type: initial encounter   Fracture type: closed   Qualified Code(s)

: S72.002A - Fracture of unspecified part of neck of left femur, initial 

encounter for closed fracture   


Is this a current diagnosis for this admission?: Yes

## 2018-04-11 NOTE — PDOC PROGRESS REPORT
Subjective


Progress Note for:: 04/11/18


Subjective:: 





He was seen by the bedside, he complained of insomnia, undergoing physical 

therapy


Reason For Visit: 


LEFT HIP FRACTURE








Physical Exam


Vital Signs: 


 











Temp Pulse Resp BP Pulse Ox


 


 99.0 F   95   16   117/76   100 


 


 04/11/18 16:00  04/11/18 16:00  04/11/18 16:00  04/11/18 16:00  04/11/18 16:00








 Intake & Output











 04/10/18 04/11/18 04/12/18





 06:59 06:59 06:59


 


Intake Total 7890 630 


 


Output Total 2250 925 


 


Balance 5640 -295 











General appearance: PRESENT: no acute distress


Head exam: PRESENT: atraumatic, normocephalic


Eye exam: PRESENT: conjunctiva pink, EOMI, PERRLA


Ear exam: PRESENT: normal external ear exam


Mouth exam: PRESENT: moist, tongue midline


Neck exam: PRESENT: full ROM


Respiratory exam: PRESENT: clear to auscultation nacho


Cardiovascular exam: PRESENT: RRR, +S1, +S2


Pulses: PRESENT: normal dorsalis pedis pul, +2 pedal pulses bilateral


Vascular exam: PRESENT: normal capillary refill


GI/Abdominal exam: PRESENT: normal bowel sounds, soft


Rectal exam: PRESENT: deferred


Neurological exam: PRESENT: alert


Psychiatric exam: PRESENT: appropriate affect, normal mood


Skin exam: PRESENT: dry, intact, warm





Results


Laboratory Results: 


 





 04/10/18 06:02 





 04/10/18 06:02 








Impressions: 


 





Ankle X-Ray  04/09/18 00:00


IMPRESSION:  IMAGE(S) OBTAINED DURING PROCEDURE.


 








Fluoroscopy  04/09/18 00:00


IMPRESSION:  IMAGE(S) OBTAINED DURING PROCEDURE.


 








Hip X-Ray  04/09/18 00:00


IMPRESSION:  IMAGE(S) OBTAINED DURING PROCEDURE.


 








Shoulder X-Ray  04/09/18 00:00


IMPRESSION:  IMAGE(S) OBTAINED DURING PROCEDURE.


 














Assessment & Plan





- Diagnosis


(1) Bimalleolar fracture of left ankle


Qualifiers: 


   Encounter type: initial encounter   Fracture type: closed   Qualified Code(s)

: S82.842A - Displaced bimalleolar fracture of left lower leg, initial 

encounter for closed fracture   


Is this a current diagnosis for this admission?: Yes   





(2) Left humeral fracture


Qualifiers: 


   Encounter type: initial encounter   Humerus Location: proximal   Fracture 

type: closed   Fracture morphology: other fracture   Fracture alignment: 

nondisplaced   Qualified Code(s): S42.295A - Other nondisplaced fracture of 

upper end of left humerus, initial encounter for closed fracture   


Is this a current diagnosis for this admission?: Yes   





(3) Chronic kidney disease, stage 4 (severe)


Is this a current diagnosis for this admission?: Yes   





(4) Diabetes mellitus


Qualifiers: 


   Diabetes mellitus type: type 2   Diabetes mellitus long term insulin use: 

with long term use   Diabetes mellitus complication status: with ophthalmic 

complications   Diabetes mellitus complication detail: with diabetic 

retinopathy   Diabetic retinopathy severity: with severe nonproliferative 

retinopathy   Diabetes mellitus macular edema: with macular edema   Laterality: 

bilateral   Qualified Code(s): E11.3413 - Type 2 diabetes mellitus with severe 

nonproliferative diabetic retinopathy with macular edema, bilateral; Z79.4 - 

Long term (current) use of insulin; Z79.4 - Long term (current) use of insulin; 

Z79.4 - Long term (current) use of insulin; Z79.4 - Long term (current) use of 

insulin   


Is this a current diagnosis for this admission?: Yes   





(5) Hip fracture, left


Qualifiers: 


   Encounter type: initial encounter   Fracture type: closed   Qualified Code(s)

: S72.002A - Fracture of unspecified part of neck of left femur, initial 

encounter for closed fracture   


Is this a current diagnosis for this admission?: Yes

## 2018-04-11 NOTE — PDOC PROGRESS REPORT
Subjective


Progress Note for:: 04/11/18


Reason For Visit: 


LEFT HIP FRACTURE


54-year-old  male status post open reduction internal fixation of left 

proximal humerus, left femoral neck, and left bimalleolar ankle fracture on 

Monday.  Patient received no physical therapy yesterday





Physical Exam


Vital Signs: 


 











Temp Pulse Resp BP Pulse Ox


 


 37.6 C   102 H  18   158/75 H  100 


 


 04/11/18 06:01  04/11/18 06:01  04/11/18 06:01  04/11/18 06:01  04/11/18 06:01








 Intake & Output











 04/10/18 04/11/18 04/12/18





 06:59 06:59 06:59


 


Intake Total 7890 630 


 


Output Total 2250 925 


 


Balance 5640 -295 











General appearance: PRESENT: no acute distress


Head exam: PRESENT: normocephalic


Respiratory exam: PRESENT: unlabored


Cardiovascular exam: PRESENT: RRR


Pulses: PRESENT: normal radial pulses, +1 pedal pulses bilateral


Vascular exam: PRESENT: normal capillary refill


GI/Abdominal exam: PRESENT: soft


Rectal exam: PRESENT: deferred


Musculoskeletal exam: PRESENT: other - Left shoulder dressing clean dry and 

intact, left hip dressing clean dry and intact, left ankle posterior splint 

intact.


Neurological exam: PRESENT: alert, awake, oriented to person, oriented to place

, oriented to time, oriented to situation.  ABSENT: motor sensory deficit


Psychiatric exam: PRESENT: appropriate affect, normal mood.  ABSENT: homicidal 

ideation, suicidal ideation


Skin exam: PRESENT: dry, intact, warm.  ABSENT: cyanosis, rash





Results


Laboratory Results: 


 





 04/10/18 06:02 





 04/10/18 06:02 








Impressions: 


 





Ankle X-Ray  04/09/18 00:00


IMPRESSION:  IMAGE(S) OBTAINED DURING PROCEDURE.


 








Fluoroscopy  04/09/18 00:00


IMPRESSION:  IMAGE(S) OBTAINED DURING PROCEDURE.


 








Hip X-Ray  04/09/18 00:00


IMPRESSION:  IMAGE(S) OBTAINED DURING PROCEDURE.


 








Shoulder X-Ray  04/09/18 00:00


IMPRESSION:  IMAGE(S) OBTAINED DURING PROCEDURE.


 











Status: Imported from PACS





Assessment & Plan





- Diagnosis


(1) Bimalleolar fracture of right ankle


Is this a current diagnosis for this admission?: Yes   


Plan: 


Patient to be mobilized with physical therapy for touchdown weightbearing on 

the left lower extremity, second request








(2) Fracture, humerus, proximal


Qualifiers: 


   Encounter type: initial encounter   Fracture type: closed   Fracture 

morphology: other fracture   Fracture alignment: nondisplaced   Laterality: 

left   Qualified Code(s): S42.295A - Other nondisplaced fracture of upper end 

of left humerus, initial encounter for closed fracture   


Is this a current diagnosis for this admission?: Yes   





(3) Hip fracture, left


Qualifiers: 


   Encounter type: initial encounter   Fracture type: closed   Qualified Code(s)

: S72.002A - Fracture of unspecified part of neck of left femur, initial 

encounter for closed fracture   


Is this a current diagnosis for this admission?: Yes   





- Plan Summary


Plan Summary: 





Patient's discharge plans will depend significantly on his functional recovery.

  At this point he has not been seen by physical therapy and it is not clear 

what the patient's mobility will be.  He may well be better off placed in a 

skilled nursing facility.

## 2018-04-12 NOTE — PDOC PROGRESS REPORT
Subjective


Progress Note for:: 04/12/18


Reason For Visit: 


LEFT HIP FRACTURE


54-year-old  male status post open reduction of left proximal humerus, 

left femoral neck, and left bimalleolar ankle fracture.  Postoperative course 

has been uneventful.  Patient starting to make progress with physical therapy.





Physical Exam


Vital Signs: 


 











Temp Pulse Resp BP Pulse Ox


 


 36.8 C   89   17   140/62 H  100 


 


 04/12/18 04:01  04/12/18 04:01  04/12/18 04:01  04/12/18 04:01  04/12/18 04:01








 Intake & Output











 04/10/18 04/11/18 04/12/18





 06:59 06:59 06:59


 


Intake Total 7890 630 360


 


Output Total 2250 925 400


 


Balance 5640 -295 -40











General appearance: PRESENT: no acute distress


Head exam: PRESENT: normocephalic


Respiratory exam: PRESENT: unlabored


Cardiovascular exam: PRESENT: RRR


Vascular exam: PRESENT: normal capillary refill


GI/Abdominal exam: PRESENT: soft


Rectal exam: PRESENT: deferred


Extremities exam: PRESENT: other - All 3 fracture sites the dressing remains 

clean dry and intact.  Distal neurovascular examination is intact.


Neurological exam: PRESENT: alert, awake, oriented to person, oriented to place

, oriented to time, oriented to situation.  ABSENT: motor sensory deficit


Psychiatric exam: PRESENT: appropriate affect, normal mood.  ABSENT: homicidal 

ideation, suicidal ideation


Skin exam: PRESENT: dry, intact, warm.  ABSENT: cyanosis, rash





Results


Laboratory Results: 


 





 04/10/18 06:02 





 04/10/18 06:02 








Impressions: 


 





Ankle X-Ray  04/09/18 00:00


IMPRESSION:  IMAGE(S) OBTAINED DURING PROCEDURE.


 








Fluoroscopy  04/09/18 00:00


IMPRESSION:  IMAGE(S) OBTAINED DURING PROCEDURE.


 








Hip X-Ray  04/09/18 00:00


IMPRESSION:  IMAGE(S) OBTAINED DURING PROCEDURE.


 








Shoulder X-Ray  04/09/18 00:00


IMPRESSION:  IMAGE(S) OBTAINED DURING PROCEDURE.


 











Status: Imported from PACS





Assessment & Plan





- Diagnosis


(1) Bimalleolar fracture of right ankle


Is this a current diagnosis for this admission?: Yes   


Plan: 


Patient is being mobilized on a touchdown weightbearing restriction on the left 

lower extremity.








(2) Fracture, humerus, proximal


Qualifiers: 


   Encounter type: initial encounter   Fracture type: closed   Fracture 

morphology: other fracture   Fracture alignment: nondisplaced   Laterality: 

left   Qualified Code(s): S42.295A - Other nondisplaced fracture of upper end 

of left humerus, initial encounter for closed fracture   


Is this a current diagnosis for this admission?: Yes   


Plan: 


Stable








(3) Hip fracture, left


Qualifiers: 


   Encounter type: initial encounter   Fracture type: closed   Qualified Code(s)

: S72.002A - Fracture of unspecified part of neck of left femur, initial 

encounter for closed fracture   


Is this a current diagnosis for this admission?: Yes   


Plan: 


Touchdown weightbearing restriction








- Time


Time Spent with patient: 15-24 minutes


Anticipated discharge: SNF


Within: when bed available

## 2018-04-12 NOTE — PDOC PROGRESS REPORT
Subjective


Progress Note for:: 04/12/18


Subjective:: 





He was seen by the bedside, he complained of insomnia, undergoing physical 

therapy


Reason For Visit: 


LEFT HIP FRACTURE








Physical Exam


Vital Signs: 


 











Temp Pulse Resp BP Pulse Ox


 


 98.4 F   97   20   147/73 H  98 


 


 04/12/18 16:52  04/12/18 19:00  04/12/18 16:52  04/12/18 16:52  04/12/18 16:52








 Intake & Output











 04/11/18 04/12/18 04/13/18





 06:59 06:59 06:59


 


Intake Total 630 765 360


 


Output Total 925 1100 300


 


Balance -295 -335 60











General appearance: PRESENT: no acute distress, well-developed, well-nourished


Head exam: PRESENT: atraumatic, normocephalic


Eye exam: PRESENT: conjunctiva pink, EOMI, PERRLA


Ear exam: PRESENT: normal external ear exam


Mouth exam: PRESENT: moist, tongue midline


Neck exam: PRESENT: full ROM


Respiratory exam: PRESENT: clear to auscultation nacho


Cardiovascular exam: PRESENT: RRR, +S1, +S2


Vascular exam: PRESENT: normal capillary refill


GI/Abdominal exam: PRESENT: normal bowel sounds, soft


Rectal exam: PRESENT: deferred


Neurological exam: PRESENT: alert


Psychiatric exam: PRESENT: appropriate affect, normal mood


Skin exam: PRESENT: dry, intact, warm.  ABSENT: cyanosis, rash





Results


Laboratory Results: 


 





 04/10/18 06:02 





 04/10/18 06:02 








Impressions: 


 





Ankle X-Ray  04/09/18 00:00


IMPRESSION:  IMAGE(S) OBTAINED DURING PROCEDURE.


 








Fluoroscopy  04/09/18 00:00


IMPRESSION:  IMAGE(S) OBTAINED DURING PROCEDURE.


 








Hip X-Ray  04/09/18 00:00


IMPRESSION:  IMAGE(S) OBTAINED DURING PROCEDURE.


 








Shoulder X-Ray  04/09/18 00:00


IMPRESSION:  IMAGE(S) OBTAINED DURING PROCEDURE.


 














Assessment & Plan





- Diagnosis


(1) Bimalleolar fracture of left ankle


Qualifiers: 


   Encounter type: initial encounter   Fracture type: closed   Qualified Code(s)

: S82.842A - Displaced bimalleolar fracture of left lower leg, initial 

encounter for closed fracture   


Is this a current diagnosis for this admission?: Yes   





(2) Left humeral fracture


Qualifiers: 


   Encounter type: initial encounter   Humerus Location: proximal   Fracture 

type: closed   Fracture morphology: other fracture   Fracture alignment: 

nondisplaced   Qualified Code(s): S42.295A - Other nondisplaced fracture of 

upper end of left humerus, initial encounter for closed fracture   


Is this a current diagnosis for this admission?: Yes   





(3) Chronic kidney disease, stage 4 (severe)


Is this a current diagnosis for this admission?: Yes   





(4) Diabetes mellitus


Qualifiers: 


   Diabetes mellitus type: type 2   Diabetes mellitus long term insulin use: 

with long term use   Diabetes mellitus complication status: with ophthalmic 

complications   Diabetes mellitus complication detail: with diabetic 

retinopathy   Diabetic retinopathy severity: with severe nonproliferative 

retinopathy   Diabetes mellitus macular edema: with macular edema   Laterality: 

bilateral   Qualified Code(s): E11.3413 - Type 2 diabetes mellitus with severe 

nonproliferative diabetic retinopathy with macular edema, bilateral; Z79.4 - 

Long term (current) use of insulin; Z79.4 - Long term (current) use of insulin; 

Z79.4 - Long term (current) use of insulin; Z79.4 - Long term (current) use of 

insulin   


Is this a current diagnosis for this admission?: Yes   





(5) Hip fracture, left


Qualifiers: 


   Encounter type: initial encounter   Fracture type: closed   Qualified Code(s)

: S72.002A - Fracture of unspecified part of neck of left femur, initial 

encounter for closed fracture   


Is this a current diagnosis for this admission?: Yes

## 2018-04-13 NOTE — PDOC PROGRESS REPORT
Subjective


Progress Note for:: 04/13/18


Reason For Visit: 


LEFT HIP FRACTURE


54-year-old male status post a fall with left upper and lower extremity 

fractures status post open reduction internal fixation on April 9.  Used on a 

touchdown weightbearing restriction and being seen by physical therapy for 

mobilization.





Physical Exam


Vital Signs: 


 











Temp Pulse Resp BP Pulse Ox


 


 37.0 C   102 H  18   155/62 H  97 


 


 04/12/18 19:34  04/13/18 05:16  04/12/18 19:34  04/13/18 05:16  04/13/18 05:16








 Intake & Output











 04/12/18 04/13/18 04/14/18





 06:59 06:59 06:59


 


Intake Total 765 810 


 


Output Total 1100 950 


 


Balance -335 -140 











General appearance: PRESENT: no acute distress


Head exam: PRESENT: normocephalic


Respiratory exam: PRESENT: unlabored


Cardiovascular exam: PRESENT: RRR


Pulses: PRESENT: +1 pedal pulses bilateral


Vascular exam: PRESENT: normal capillary refill


GI/Abdominal exam: PRESENT: soft


Rectal exam: PRESENT: deferred


Extremities exam: PRESENT: other - Left shoulder, left hip dressings are clean 

dry and intact.  Left ankle posterior splint is intact.


Neurological exam: PRESENT: alert, awake, oriented to person, oriented to place

, oriented to time, oriented to situation.  ABSENT: motor sensory deficit


Psychiatric exam: PRESENT: appropriate affect, normal mood.  ABSENT: homicidal 

ideation, suicidal ideation


Skin exam: PRESENT: dry, intact, warm.  ABSENT: cyanosis, rash





Results


Laboratory Results: 


 





 04/10/18 06:02 





 04/10/18 06:02 








Impressions: 


 





Ankle X-Ray  04/09/18 00:00


IMPRESSION:  IMAGE(S) OBTAINED DURING PROCEDURE.


 








Fluoroscopy  04/09/18 00:00


IMPRESSION:  IMAGE(S) OBTAINED DURING PROCEDURE.


 








Hip X-Ray  04/09/18 00:00


IMPRESSION:  IMAGE(S) OBTAINED DURING PROCEDURE.


 








Shoulder X-Ray  04/09/18 00:00


IMPRESSION:  IMAGE(S) OBTAINED DURING PROCEDURE.


 











Status: Imported from PACS





Assessment & Plan





- Diagnosis


(1) Bimalleolar fracture of right ankle


Is this a current diagnosis for this admission?: Yes   





(2) Fracture, humerus, proximal


Qualifiers: 


   Encounter type: initial encounter   Fracture type: closed   Fracture 

morphology: other fracture   Fracture alignment: nondisplaced   Laterality: 

left   Qualified Code(s): S42.295A - Other nondisplaced fracture of upper end 

of left humerus, initial encounter for closed fracture   


Is this a current diagnosis for this admission?: Yes   





(3) Hip fracture, left


Qualifiers: 


   Encounter type: initial encounter   Fracture type: closed   Qualified Code(s)

: S72.002A - Fracture of unspecified part of neck of left femur, initial 

encounter for closed fracture   


Is this a current diagnosis for this admission?: Yes   





- Time


Time Spent with patient: 15-24 minutes


Anticipated discharge: SNF - Patient awaiting rehab placement.  Situation is 

complicated because of the staged nature of his fractures and the fact that 

[employer] is only going to accept responsibility for the ankle 

fracture.


Within: Other

## 2018-04-13 NOTE — PDOC PROGRESS REPORT
Subjective


Progress Note for:: 04/13/18


Subjective:: 





He was seen by the bedside, he complained of insomnia, undergoing physical 

therapy


Reason For Visit: 


LEFT HIP FRACTURE








Physical Exam


Vital Signs: 


 











Temp Pulse Resp BP Pulse Ox


 


 98.2 F   98   18   163/64 H  97 


 


 04/13/18 15:02  04/13/18 20:02  04/13/18 20:02  04/13/18 15:02  04/13/18 20:02








 Intake & Output











 04/12/18 04/13/18 04/14/18





 06:59 06:59 06:59


 


Intake Total 765 810 350


 


Output Total 1100 950 150


 


Balance -335 -140 200











General appearance: PRESENT: no acute distress


Eye exam: PRESENT: PERRLA


Respiratory exam: PRESENT: clear to auscultation nacho


Cardiovascular exam: PRESENT: +S1, +S2


GI/Abdominal exam: PRESENT: soft


Neurological exam: PRESENT: alert





Results


Laboratory Results: 


 





 04/10/18 06:02 





 04/10/18 06:02 








Impressions: 


 





Ankle X-Ray  04/09/18 00:00


IMPRESSION:  IMAGE(S) OBTAINED DURING PROCEDURE.


 








Fluoroscopy  04/09/18 00:00


IMPRESSION:  IMAGE(S) OBTAINED DURING PROCEDURE.


 








Hip X-Ray  04/09/18 00:00


IMPRESSION:  IMAGE(S) OBTAINED DURING PROCEDURE.


 








Shoulder X-Ray  04/09/18 00:00


IMPRESSION:  IMAGE(S) OBTAINED DURING PROCEDURE.


 














Assessment & Plan





- Diagnosis


(1) Bimalleolar fracture of left ankle


Qualifiers: 


   Encounter type: initial encounter   Fracture type: closed   Qualified Code(s)

: S82.842A - Displaced bimalleolar fracture of left lower leg, initial 

encounter for closed fracture   


Is this a current diagnosis for this admission?: Yes   





(2) Left humeral fracture


Qualifiers: 


   Encounter type: initial encounter   Humerus Location: proximal   Fracture 

type: closed   Fracture morphology: other fracture   Fracture alignment: 

nondisplaced   Qualified Code(s): S42.295A - Other nondisplaced fracture of 

upper end of left humerus, initial encounter for closed fracture   


Is this a current diagnosis for this admission?: Yes   





(3) Chronic kidney disease, stage 4 (severe)


Is this a current diagnosis for this admission?: Yes   





(4) Diabetes mellitus


Qualifiers: 


   Diabetes mellitus type: type 2   Diabetes mellitus long term insulin use: 

with long term use   Diabetes mellitus complication status: with ophthalmic 

complications   Diabetes mellitus complication detail: with diabetic 

retinopathy   Diabetic retinopathy severity: with severe nonproliferative 

retinopathy   Diabetes mellitus macular edema: with macular edema   Laterality: 

bilateral   Qualified Code(s): E11.3413 - Type 2 diabetes mellitus with severe 

nonproliferative diabetic retinopathy with macular edema, bilateral; Z79.4 - 

Long term (current) use of insulin; Z79.4 - Long term (current) use of insulin; 

Z79.4 - Long term (current) use of insulin; Z79.4 - Long term (current) use of 

insulin   


Is this a current diagnosis for this admission?: Yes   





(5) Hip fracture, left


Qualifiers: 


   Encounter type: initial encounter   Fracture type: closed   Qualified Code(s)

: S72.002A - Fracture of unspecified part of neck of left femur, initial 

encounter for closed fracture   


Is this a current diagnosis for this admission?: Yes

## 2018-04-14 NOTE — PDOC PROGRESS REPORT
Subjective


Progress Note for:: 04/14/18


Subjective:: 





Patient is essentially waiting for placement for rehabilitation


Reason For Visit: 


LEFT HIP FRACTURE








Physical Exam


Vital Signs: 


 











Temp Pulse Resp BP Pulse Ox


 


 98.6 F   85   16   140/61 H  97 


 


 04/14/18 15:42  04/14/18 15:42  04/14/18 15:42  04/14/18 15:42  04/14/18 15:42








 Intake & Output











 04/13/18 04/14/18 04/15/18





 06:59 06:59 06:59


 


Intake Total 810 1090 450


 


Output Total 950 150 


 


Balance -140 940 450











General appearance: PRESENT: no acute distress


Eye exam: PRESENT: PERRLA


Respiratory exam: PRESENT: clear to auscultation nacho


Cardiovascular exam: PRESENT: +S1, +S2


Neurological exam: PRESENT: alert





Results


Laboratory Results: 


 





 04/10/18 06:02 





 04/10/18 06:02 








Impressions: 


 





Ankle X-Ray  04/09/18 00:00


IMPRESSION:  IMAGE(S) OBTAINED DURING PROCEDURE.


 








Fluoroscopy  04/09/18 00:00


IMPRESSION:  IMAGE(S) OBTAINED DURING PROCEDURE.


 








Hip X-Ray  04/09/18 00:00


IMPRESSION:  IMAGE(S) OBTAINED DURING PROCEDURE.


 








Shoulder X-Ray  04/09/18 00:00


IMPRESSION:  IMAGE(S) OBTAINED DURING PROCEDURE.


 














Assessment & Plan





- Diagnosis


(1) Bimalleolar fracture of left ankle


Qualifiers: 


   Encounter type: initial encounter   Fracture type: closed   Qualified Code(s)

: S82.842A - Displaced bimalleolar fracture of left lower leg, initial 

encounter for closed fracture   


Is this a current diagnosis for this admission?: Yes   





(2) Left humeral fracture


Qualifiers: 


   Encounter type: initial encounter   Humerus Location: proximal   Fracture 

type: closed   Fracture morphology: other fracture   Fracture alignment: 

nondisplaced   Qualified Code(s): S42.295A - Other nondisplaced fracture of 

upper end of left humerus, initial encounter for closed fracture   


Is this a current diagnosis for this admission?: Yes   





(3) Chronic kidney disease, stage 4 (severe)


Is this a current diagnosis for this admission?: Yes   





(4) Diabetes mellitus


Qualifiers: 


   Diabetes mellitus type: type 2   Diabetes mellitus long term insulin use: 

with long term use   Diabetes mellitus complication status: with ophthalmic 

complications   Diabetes mellitus complication detail: with diabetic 

retinopathy   Diabetic retinopathy severity: with severe nonproliferative 

retinopathy   Diabetes mellitus macular edema: with macular edema   Laterality: 

bilateral   Qualified Code(s): E11.3413 - Type 2 diabetes mellitus with severe 

nonproliferative diabetic retinopathy with macular edema, bilateral; Z79.4 - 

Long term (current) use of insulin; Z79.4 - Long term (current) use of insulin; 

Z79.4 - Long term (current) use of insulin; Z79.4 - Long term (current) use of 

insulin   


Is this a current diagnosis for this admission?: Yes   





(5) Hip fracture, left


Qualifiers: 


   Encounter type: initial encounter   Fracture type: closed   Qualified Code(s)

: S72.002A - Fracture of unspecified part of neck of left femur, initial 

encounter for closed fracture   


Is this a current diagnosis for this admission?: Yes

## 2018-04-14 NOTE — PDOC PROGRESS REPORT
Subjective


Progress Note for:: 04/14/18


Subjective:: 





Patient sitting on chair at bedside.  States pain is slowly improving.  Was 

able to ambulate 30 feet today in physical therapy.  Denies chest pain or 

shortness of breath.


Reason For Visit: 


LEFT HIP FRACTURE








Physical Exam


Vital Signs: 


 











Temp Pulse Resp BP Pulse Ox


 


 97.5 F   88   16   144/64 H  99 


 


 04/14/18 11:35  04/14/18 13:59  04/14/18 11:35  04/14/18 11:35  04/14/18 13:46








 Intake & Output











 04/13/18 04/14/18 04/15/18





 06:59 06:59 06:59


 


Intake Total 810 1090 450


 


Output Total 950 150 


 


Balance -140 940 450











Musculoskeletal exam: PRESENT: other - Left lower extremity: Splint clean/dry/

intact intact flexion extension of the toes.  Cap refill less than 2 seconds.  

No sensory deficits.  No pain with passive stretch.  Surgical dressing intact 

no erythema or drainage.  No limb length inequality.  Left upper extremity: 

Dressing clean/dry/intact no erythema or drainage.  Intact flexion extension of 

the elbow.





Results


Laboratory Results: 


 





 04/10/18 06:02 





 04/10/18 06:02 








Impressions: 


 





Ankle X-Ray  04/09/18 00:00


IMPRESSION:  IMAGE(S) OBTAINED DURING PROCEDURE.


 








Fluoroscopy  04/09/18 00:00


IMPRESSION:  IMAGE(S) OBTAINED DURING PROCEDURE.


 








Hip X-Ray  04/09/18 00:00


IMPRESSION:  IMAGE(S) OBTAINED DURING PROCEDURE.


 








Shoulder X-Ray  04/09/18 00:00


IMPRESSION:  IMAGE(S) OBTAINED DURING PROCEDURE.


 














Assessment & Plan





- Diagnosis


(1) Bimalleolar fracture of left ankle


Qualifiers: 


   Encounter type: initial encounter   Fracture type: closed   Qualified Code(s)

: S82.842A - Displaced bimalleolar fracture of left lower leg, initial 

encounter for closed fracture   


Is this a current diagnosis for this admission?: Yes   


Plan: 


Status post ORIF left proximal humerus, left femoral neck, left bimalleolar 

ankle fracture





#1 continue physical therapy


#2 aspirin for DVT prophylaxis


#3 pain control


#4 discharge planning to skilled nursing facility when details of discharge 

complete.  Patient orthopedically stable for discharge.

## 2018-04-15 NOTE — PDOC DISCHARGE SUMMARY
General





- Admit/Disc Date/PCP


Admission Date/Primary Care Provider: 


  04/08/18 16:09





  CATHI FARMER MD





Discharge Date: 04/16/18





- Discharge Diagnosis


(1) Bimalleolar fracture of left ankle


Is this a current diagnosis for this admission?: Yes   





(2) Left humeral fracture


Is this a current diagnosis for this admission?: Yes   





(3) Chronic kidney disease, stage 4 (severe)


Is this a current diagnosis for this admission?: Yes   





(4) Diabetes mellitus


Is this a current diagnosis for this admission?: Yes   





(5) Hip fracture, left


Is this a current diagnosis for this admission?: Yes   





- Additional Information


Resuscitation Status: Full Code


Prescriptions: 


Linagliptin [Tradjenta] 5 mg PO DAILY #90 tablet


Home Medications: 








RX: Amlodipine Besylate 10 mg PO DAILY 06/23/17 


RX: Furosemide [Lasix] 40 mg PO QAM 06/23/17 


RX: Lisinopril 40 mg PO BID 06/23/17 


RX: Calcitriol [Rocaltrol 0.5 mcg Capsule] 0.5 mcg PO DAILY 04/09/18 


RX: Clonidine HCl [Catapres 0.1 mg Tablet] 0.1 mg PO QHS 04/09/18 


RX: Ergocalciferol (Vitamin D2) [Drisdol 50,000 unit (1.25MG) Capsule] 50,000 

unit PO FR 04/09/18 


RX: Hydralazine HCl [Apresoline 50 mg Tablet] 50 mg PO TID 04/09/18 


Linagliptin [Tradjenta] 5 mg PO DAILY #90 tablet 04/15/18 











History of Present Illness


History of Present Illness: 





Patient is 54-year-old male with history of type 2 diabetes mellitus 

complicated with stage IV chronic kidney disease, retinopathy, he is sustained 

fracture of the left ankle, He  was scheduled for open reduction and internal 

fixation of the left ankle this coming Thursday roughly 3 days from today, he 

was on crutches, he fell on crutches and sustained fracture of the left femoral 

neck of the left hip, left proximal humerus fracture of the left shoulder.  He 

came to the emergency room for evaluation of these multiple fractures, he was 

seen by orthopedic and he underwent internal fixation and open reduction of the 

left ankle, left hip and left humerus.There was no antecedent chest pain, 

shortness of breath, loss of consciousness patient probably have  osteoporosis, 

he would need a bone density measurement in light of this multiple fracture





Hospital Course


Hospital Course: 





He fell and sustained fracture of the left hip shoulder and ankle was seen by 

the orthopedic he underwent open reduction and internal fixation of the 

fracture joints and bones, he was orthopedic.  He also had physical therapy in 

the hospital





Physical Exam


Vital Signs: 


 











Temp Pulse Resp BP Pulse Ox


 


 98.0 F   86   16   145/69 H  97 


 


 04/15/18 11:14  04/15/18 11:14  04/15/18 11:14  04/15/18 11:14  04/15/18 11:14








 Intake & Output











 04/14/18 04/15/18 04/16/18





 06:59 06:59 06:59


 


Intake Total 1090 1070 600


 


Output Total 150  


 


Balance 940 1070 600











General appearance: PRESENT: no acute distress, well-developed, well-nourished


Head exam: PRESENT: atraumatic, normocephalic


Eye exam: PRESENT: conjunctiva pink, EOMI, PERRLA


Ear exam: PRESENT: normal external ear exam


Mouth exam: PRESENT: moist, tongue midline


Neck exam: PRESENT: full ROM


Cardiovascular exam: PRESENT: RRR, +S1, +S2


Pulses: PRESENT: normal dorsalis pedis pul, +2 pedal pulses bilateral


Vascular exam: PRESENT: normal capillary refill


GI/Abdominal exam: PRESENT: normal bowel sounds, soft


Rectal exam: PRESENT: deferred


Neurological exam: PRESENT: alert, CN II-XII grossly intact.  ABSENT: motor 

sensory deficit


Psychiatric exam: PRESENT: appropriate affect, normal mood


Skin exam: PRESENT: dry, intact, warm.  ABSENT: cyanosis, rash





Results


Laboratory Results: 


 





 04/10/18 06:02 





 04/10/18 06:02 








Impressions: 


 





Ankle X-Ray  04/09/18 00:00


IMPRESSION:  IMAGE(S) OBTAINED DURING PROCEDURE.


 








Fluoroscopy  04/09/18 00:00


IMPRESSION:  IMAGE(S) OBTAINED DURING PROCEDURE.


 








Hip X-Ray  04/09/18 00:00


IMPRESSION:  IMAGE(S) OBTAINED DURING PROCEDURE.


 








Shoulder X-Ray  04/09/18 00:00


IMPRESSION:  IMAGE(S) OBTAINED DURING PROCEDURE.


 














Qualifiers





- *


**PATEINT BEING DISCHARGED WITH ANY OF THE FOLLOWING DIAGNOSIS?: No

## 2018-04-15 NOTE — PDOC PROGRESS REPORT
Subjective


Progress Note for:: 04/15/18


Subjective:: 





Patient ambulating with walker to chair at the bedside when I entered the room.

  Only placing toe-touch weightbearing on left lower extremity.  He reports 

pain is well managed and has no other concerns at this time.  He is curious 

when he will be discharged from the hospital.  He also has questions about 

workman's comp and disability.  Patient is reassured that from an orthopedic 

standpoint he can be discharged from the hospital however discharge planning 

and hospitalist service will be coordinating his disposition upon discharge.  

Patient voiced understanding and is in agreement.


Reason For Visit: 


LEFT HIP FRACTURE








Physical Exam


Vital Signs: 


 











Temp Pulse Resp BP Pulse Ox


 


 36.8 C   87   12   154/67 H  95 


 


 04/15/18 08:07  04/15/18 08:07  04/15/18 08:07  04/15/18 08:07  04/15/18 08:07








 Intake & Output











 04/14/18 04/15/18 04/16/18





 06:59 06:59 06:59


 


Intake Total 1090 1070 


 


Output Total 150  


 


Balance 940 1070 











General appearance: PRESENT: no acute distress, well-developed, well-nourished


Head exam: PRESENT: atraumatic, normocephalic


Musculoskeletal exam: PRESENT: ambulatory


Additional comments: 





Left lower extremity: Postoperative splint and dressing clean dry and intact.  

This is left in place.  Foot and toes are nontender to palpation.  Resolving 

ecchymosis noted over dorsum of foot.  Less than 2 seconds capillary refill to 

the toes of left lower extremity.  No pain with stretch applied.  He does note 

part of the splint along the posterior calf "digs into my skin".  Patient is 

reassured that this can be padded with gauze to facilitate comfort. 





Left upper extremity: Postoperative dressing clean dry and intact.  This is 

left in place.  He is nontender to palpation.  No evidence of compartment 

syndrome.  No pain on initiation of motion and appropriate range of passive 

range of motion.  +2 radial pulse, less than 2 seconds capillary refill to 

fingers of left upper extremity.  No sensory or motor deficits noted.





Neurological exam: PRESENT: alert, awake, oriented to person, oriented to place

, oriented to time, oriented to situation, CN II-XII grossly intact.  ABSENT: 

motor sensory deficit


Psychiatric exam: PRESENT: appropriate affect, normal mood.  ABSENT: homicidal 

ideation, suicidal ideation





Results


Laboratory Results: 


 





 04/10/18 06:02 





 04/10/18 06:02 








Impressions: 


 





Ankle X-Ray  04/09/18 00:00


IMPRESSION:  IMAGE(S) OBTAINED DURING PROCEDURE.


 








Fluoroscopy  04/09/18 00:00


IMPRESSION:  IMAGE(S) OBTAINED DURING PROCEDURE.


 








Hip X-Ray  04/09/18 00:00


IMPRESSION:  IMAGE(S) OBTAINED DURING PROCEDURE.


 








Shoulder X-Ray  04/09/18 00:00


IMPRESSION:  IMAGE(S) OBTAINED DURING PROCEDURE.


 














Assessment & Plan





- Diagnosis


(1) Bimalleolar fracture of left ankle


Qualifiers: 


   Encounter type: initial encounter   Fracture type: closed   Qualified Code(s)

: S82.842A - Displaced bimalleolar fracture of left lower leg, initial 

encounter for closed fracture   


Is this a current diagnosis for this admission?: Yes   





(2) Hip fracture, left


Qualifiers: 


   Encounter type: initial encounter   Fracture type: closed   Qualified Code(s)

: S72.002A - Fracture of unspecified part of neck of left femur, initial 

encounter for closed fracture   


Is this a current diagnosis for this admission?: Yes   





(3) Left humeral fracture


Qualifiers: 


   Encounter type: initial encounter   Humerus Location: proximal   Fracture 

type: closed   Fracture morphology: other fracture   Fracture alignment: 

nondisplaced   Qualified Code(s): S42.295A - Other nondisplaced fracture of 

upper end of left humerus, initial encounter for closed fracture   


Is this a current diagnosis for this admission?: Yes   





- Plan Summary


Plan Summary: 





54-year-old male status post ORIF left proximal humerus, left femoral neck, 

left bimalleolar ankle fracture.  Patient doing well postoperatively and from 

an orthopedic standpoint is stable for discharge.  He will be discharged once 

discharge planning and hospitalist service see fit.  Throughout his stay in the 

hospital we will continue:





#1 physical therapy


#2 aspirin for DVT prophylaxis


#3 pain control


#4 discharge planning to skilled nursing facility when details of discharge 

complete

## 2018-12-26 NOTE — RADIOLOGY REPORT (SQ)
EXAM DESCRIPTION:  CT HEAD WITHOUT



COMPLETED DATE/TIME:  12/26/2018 2:24 pm



REASON FOR STUDY:  R51 HEADACHE R51  HEADACHE



COMPARISON:  10/28/2011



TECHNIQUE:  Axial images acquired through the brain without intravenous contrast.  Images reviewed wi
th bone, brain and subdural windows.  Additional sagittal and coronal reconstructions were generated.
 Images stored on PACS.

All CT scanners at this facility use dose modulation, iterative reconstruction, and/or weight based d
osing when appropriate to reduce radiation dose to as low as reasonably achievable (ALARA).

[facility]: Dose Right  [facility]: CareDose    [facility]: Dose Right    [facility]: Teradose 4D    [facility]: Smart Technologies



RADIATION DOSE:  CT Rad equipment meets quality standard of care and radiation dose reduction techniq
ues were employed. CTDIvol: 48.6 mGy. DLP: 904 mGy-cm. mGy.



LIMITATIONS:  None.



FINDINGS:  VENTRICLES: Normal size and contour.

CEREBRUM: No masses.  No hemorrhage.  No midline shift.  No evidence for acute infarction. Normal gra
y/white matter differentiation. No areas of low density in the white matter.

CEREBELLUM: No masses.  No hemorrhage.  No alteration of density.  No evidence for acute infarction.

EXTRAAXIAL SPACES: No fluid collections.  No masses.

ORBITS AND GLOBE: No intra- or extraconal masses.  Normal contour of globe without masses.

CALVARIUM: No fracture.

PARANASAL SINUSES: No fluid or mucosal thickening.

SOFT TISSUES: No mass or hematoma.

OTHER: No other significant finding.



IMPRESSION:  NORMAL BRAIN CT WITHOUT CONTRAST.

EVIDENCE OF ACUTE STROKE: NO.



COMMENT:  Quality ID # 436: Final reports with documentation of one or more dose reduction techniques
 (e.g., Automated exposure control, adjustment of the mA and/or kV according to patient size, use of 
iterative reconstruction technique)



TECHNICAL DOCUMENTATION:  JOB ID:  [id number]

 2011 [employer]- All Rights Reserved



Reading location - IP/workstation name: [email or web address]

## 2019-02-27 NOTE — RADIOLOGY REPORT (SQ)
EXAM DESCRIPTION:  CAROTID DOPPLER



COMPLETED DATE/TIME:  2/27/2019 1:40 pm



REASON FOR STUDY:  BRUITS R09.89  OTH SYMPTOMS AND SIGNS INVOLVING THE CIRC AND RESP SY



COMPARISON:  None.



TECHNIQUE:  Grayscale ultrasound, Doppler velocity and spectra, and color Doppler images acquired of 
the extra-cranial carotid and vertebral arteries. Images stored on PACS.



LIMITATIONS:  None.



FINDINGS:  RIGHT CAROTID

CCA Velocities: Normal velocities and waveforms without evidence of high-grade stenosis.

ICA Velocities

 Peak systolic 80 m/s.

 End diastolic 24 m/s.

Proximal ICA/CCA peak systolic ratio 1.08.

Eccentric plaque at the bulb without evidence of high-grade stenosis.

LEFT CAROTID

CCA Velocities: Normal velocities and waveforms without evidence of high-grade stenosis.

ICA Velocities

 Peak systolic 62 m/s.

 End diastolic 23 m/s.

Proximal ICA/CCA peak systolic ratio 0.8.

Eccentric plaque without evidence of high-grade stenosis.  .

VERTEBRAL ARTERIES: Antegrade flow.  Normal waveforms.

OTHER: Elevated velocities at the proximal external carotid arteries.  The velocity on the right nick
ures up to 210 cm/s suggestive of 50 to 69% stenosis.  The velocity on the left measures up to 145 cm
/s suggestive of 50% stenosis.



IMPRESSION:  1.  No evidence of high-grade stenosis within the common or internal carotid arteries.

2.  Elevated velocities within the external carotid arteries bilaterally, right greater than left, as
 detailed above.



COMMENT:  Quality ID #195:  Velocity criteria are extrapolated from the diameter data as defined by t
he Society of Radiologists in Ultrasound Consensus Conference. Radiology 2003: 229; 340-346.



TECHNICAL DOCUMENTATION:  JOB ID:  [id number]

 2011 [employer]- All Rights Reserved



Reading location - IP/workstation name: [email or web address]

## 2019-03-01 NOTE — EKG REPORT
SEVERITY:- ABNORMAL ECG -

SINUS RHYTHM

ABNORMAL T, CONSIDER ISCHEMIA, LATERAL LEADS

:

Confirmed by: Brown Turcios 01-Mar-2019 22:02:27

## 2019-03-01 NOTE — RADIOLOGY REPORT (SQ)
EXAM DESCRIPTION:  CHEST PA/LATERAL



COMPLETED DATE/TIME:  3/1/2019 12:07 pm



REASON FOR STUDY:  PRE-OP



COMPARISON:  1/20/2017



EXAM PARAMETERS:  NUMBER OF VIEWS: two views

TECHNIQUE: Digital Frontal and Lateral radiographic views of the chest acquired.

RADIATION DOSE: NA

LIMITATIONS: none



FINDINGS:  LUNGS AND PLEURA:  Slightly ill-defined nodular density suggested at the left lung base.  
The right lung remains clear.  No pneumothorax or pleural effusion.

MEDIASTINUM AND HILAR STRUCTURES: No masses or contour abnormalities.

HEART AND VASCULAR STRUCTURES:  Stable appearance.  No evidence for failure.

BONES: No acute findings.

HARDWARE: None in the chest.

OTHER: No other significant finding.



IMPRESSION:  1.  Slightly ill-defined nodular opacity suggested at the left lung base.  Further evalu
ation with CT chest with IV contrast.



TECHNICAL DOCUMENTATION:  JOB ID:  [id number]

 2011 [employer]- All Rights Reserved



Reading location - IP/workstation name: [email or web address]

## 2019-03-11 NOTE — RADIOLOGY REPORT (SQ)
CT HEAD WITHOUT IV CONTRAST



HISTORY: Headache.



COMPARISON: 12/26/2018



TECHNIQUE: CT scan of the brain without  IV contrast. This exam

was performed according to our departmental dose-optimization

program, which includes automated exposure control, adjustment of

the mA and/or kV according to patient size and/or use of

iterative reconstruction technique.



FINDINGS:



Diffuse involutional changes are present. There are scattered

areas of hypoattenuation within the periventricular white matter,

which likely represent chronic microvascular ischemia. No

evidence of acute infarction, intracranial hemorrhage,

extra-axial fluid collection, or midline shift. No air-fluid

levels are seen in the paranasal sinuses to suggest acute

sinusitis. No depressed skull fracture.



IMPRESSION: 



1. No acute intracranial findings.

2. Chronic microvascular ischemic disease.

## 2019-03-11 NOTE — RADIOLOGY REPORT (SQ)
EXAM DESCRIPTION: 



XR CHEST 1 VIEW



COMPLETED DATE/TME:  03/11/2019 00:00



CLINICAL HISTORY: 



55 years, Male, congestion



Findings: The heart is moderately enlarged. Aorta is within

normal limits. No consolidations or pleural effusions. No

pulmonary edema or pneumothorax.



IMPRESSION:



No acute disease.
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
## 2019-03-11 NOTE — RADIOLOGY REPORT (SQ)
CT ABDOMEN PELVIS WITHOUT IV CONTRAST



HISTORY: Abdominal pain.



COMPARISON: None.



TECHNIQUE: CT scan of the abdomen and pelvis without IV contrast.

This exam was performed according to our departmental

dose-optimization program, which includes automated exposure

control, adjustment of the mA and/or kV according to patient size

and/or use of iterative reconstruction technique.



FINDINGS:



The lung bases are clear. Small pericardial effusion is seen.

There is no hiatal hernia.



The liver, spleen, pancreas, gallbladder, adrenal glands, and

kidneys are unremarkable. No urinary stones are seen. The pelvic

organs are also unremarkable.



No small bowel obstruction. The appendix is normal. There are

scattered colonic diverticula without surrounding inflammatory

changes. No intraperitoneal free fluid or free air is seen.



The aorta is normal caliber. No acute osseous findings are

appreciated. Prior fixation of the left femoral head. There is no

body wall hernia. 



IMPRESSION:



No acute abdominal or pelvic pathology.

## 2019-03-12 NOTE — EKG REPORT
SEVERITY:- ABNORMAL ECG -

SINUS RHYTHM

ABNORMAL T, CONSIDER ISCHEMIA, LATERAL LEADS

BORDERLINE PROLONGED QT INTERVAL

:

Confirmed by: Jaxon Kenyon MD 12-Mar-2019 07:15:44

## 2019-03-12 NOTE — PDOC PROGRESS REPORT
Subjective


Progress Note for:: 03/12/19


Subjective:: 





Patient was admitted for evaluation of multiple symptoms, patient is presently 

end-stage renal disease and is appropriate at this time to initiate kidney 

replacement therapy my understanding is that patient prefer peritoneal dialysis,

the plan was for the patient to have emergent hemodialysis because he is 

manifesting symptoms of uremia but patient insisted that he has to be discharged

on Thursday because he has a job arbitration on Friday which he has been kavita escalona.  The blood pressure is severely elevated presently requiring intravenous 

nitro in addition to the p.o. medication 


Reason For Visit: 


HYPERTENSIVE EMERGENCY, CKD STAGE 5/ESRD,T2DM








Physical Exam


Vital Signs: 


                                        











Temp Pulse Resp BP Pulse Ox


 


 97.3 F   77   16   144/61 H  100 


 


 03/12/19 15:08  03/12/19 18:00  03/12/19 15:08  03/12/19 18:00  03/12/19 15:08








                                 Intake & Output











 03/11/19 03/12/19 03/13/19





 06:59 06:59 06:59


 


Intake Total  97 304


 


Output Total   25


 


Balance  97 279


 


Weight  71.8 kg 











General appearance: PRESENT: no acute distress


Eye exam: PRESENT: PERRLA


Respiratory exam: PRESENT: clear to auscultation nacho


Cardiovascular exam: PRESENT: +S1, +S2


GI/Abdominal exam: PRESENT: soft


Neurological exam: PRESENT: alert





Results


Laboratory Results: 


                                        





                                 03/12/19 06:16 





                                 03/12/19 06:16 





                                        











  03/11/19 03/11/19 03/11/19





  19:39 19:39 21:25


 


WBC   


 


RBC   


 


Hgb   


 


Hct   


 


MCV   


 


MCH   


 


MCHC   


 


RDW   


 


Plt Count   


 


Seg Neutrophils %   


 


Lymphocytes %   


 


Monocytes %   


 


Eosinophils %   


 


Basophils %   


 


Absolute Neutrophils   


 


Absolute Lymphocytes   


 


Absolute Monocytes   


 


Absolute Eosinophils   


 


Absolute Basophils   


 


Sodium  138.3  


 


Potassium  3.9  


 


Chloride  108 H  


 


Carbon Dioxide  16 L  


 


Anion Gap  14  


 


BUN  62 H  


 


Creatinine  10.34 H  


 


Est GFR ( Amer)  6 L  


 


Est GFR (Non-Af Amer)  5 L  


 


Glucose  98  


 


Calcium  7.8 L  


 


Phosphorus  6.2 H  


 


Magnesium  1.1 L*  


 


Total Bilirubin  0.4  


 


AST  26  


 


ALT  22  


 


Alkaline Phosphatase  87  


 


Total Protein  6.5  


 


Albumin  3.9  


 


Triglycerides   


 


Cholesterol   


 


LDL Cholesterol Direct   


 


VLDL Cholesterol   


 


HDL Cholesterol   


 


Amylase  181 H  


 


Lipase  394.1 H  


 


TSH   1.76 


 


Free T4   1.02 


 


Urine Color    YELLOW


 


Urine Appearance    SLIGHTLY-CLOUDY


 


Urine pH    5.0


 


Ur Specific Gravity    1.015


 


Urine Protein    >=500 H


 


Urine Glucose (UA)    150 H


 


Urine Ketones    NEGATIVE


 


Urine Blood    SMALL H


 


Urine Nitrite    NEGATIVE


 


Ur Leukocyte Esterase    NEGATIVE


 


Urine WBC (Auto)    22


 


Urine RBC (Auto)    12














  03/12/19 03/12/19





  06:16 06:16


 


WBC  2.5 L 


 


RBC  3.42 L 


 


Hgb  10.4 L 


 


Hct  30.4 L 


 


MCV  89 


 


MCH  30.5 


 


MCHC  34.4 


 


RDW  15.6 H 


 


Plt Count  83 L 


 


Seg Neutrophils %  61.4 


 


Lymphocytes %  30.3 


 


Monocytes %  7.6 


 


Eosinophils %  0.4 


 


Basophils %  0.3 


 


Absolute Neutrophils  1.5 L 


 


Absolute Lymphocytes  0.7 


 


Absolute Monocytes  0.2 


 


Absolute Eosinophils  0.0 


 


Absolute Basophils  0.0 


 


Sodium   136.7 L


 


Potassium   3.9


 


Chloride   109 H


 


Carbon Dioxide   17 L


 


Anion Gap   11


 


BUN   64 H


 


Creatinine   10.22 H


 


Est GFR ( Amer)   6 L


 


Est GFR (Non-Af Amer)   5 L


 


Glucose   107


 


Calcium   7.4 L


 


Phosphorus   6.8 H


 


Magnesium  


 


Total Bilirubin   0.3


 


AST   19


 


ALT   28


 


Alkaline Phosphatase   71


 


Total Protein   5.4 L


 


Albumin   3.0 L


 


Triglycerides   115


 


Cholesterol   108.08


 


LDL Cholesterol Direct   53


 


VLDL Cholesterol   23.0


 


HDL Cholesterol   37 L


 


Amylase  


 


Lipase  


 


TSH  


 


Free T4  


 


Urine Color  


 


Urine Appearance  


 


Urine pH  


 


Ur Specific Gravity  


 


Urine Protein  


 


Urine Glucose (UA)  


 


Urine Ketones  


 


Urine Blood  


 


Urine Nitrite  


 


Ur Leukocyte Esterase  


 


Urine WBC (Auto)  


 


Urine RBC (Auto)  








                                        











  03/11/19 03/11/19 03/11/19





  19:39 19:39 19:39


 


Creatine Kinase  Cancelled   359 H


 


CK-MB (CK-2)   3.88 


 


Troponin I   0.077 


 


NT-Pro-B Natriuret Pep   83944 H 














  03/12/19 03/12/19 03/12/19





  01:18 01:18 06:16


 


Creatine Kinase  258 H   258 H


 


CK-MB (CK-2)   2.76 


 


Troponin I   0.075 


 


NT-Pro-B Natriuret Pep   














  03/12/19





  06:16


 


Creatine Kinase 


 


CK-MB (CK-2)  2.80


 


Troponin I  0.085


 


NT-Pro-B Natriuret Pep 











Impressions: 


                                        





Abdomen/Pelvis CT  03/11/19 00:00


IMPRESSION:


 


No acute abdominal or pelvic pathology.


 








Chest X-Ray  03/11/19 00:00


IMPRESSION:


 


No acute disease.


 








Head CT  03/11/19 00:00


IMPRESSION: 


 


1. No acute intracranial findings.


2. Chronic microvascular ischemic disease.


 














Assessment & Plan





- Diagnosis


(1) Hypertensive emergency


Is this a current diagnosis for this admission?: Yes   





(2) Uremia


Is this a current diagnosis for this admission?: Yes   





(3) ESRD (end stage renal disease)


Is this a current diagnosis for this admission?: Yes   





(4) Pancytopenia


Is this a current diagnosis for this admission?: Yes   


Plan: 


consultation ,hematology

## 2019-03-12 NOTE — PDOC CONSULTATION
Consultation


Consult Date: 19


Attending physician:: CTAHI FARMER


Consult reason:: I was asked to see the patient due to worsening kidney 

function.





History of Present Illness


Admission Date/PCP: 


  19 18:35





  CATHI FARMER MD





History of Present Illness: 


CHOCO MONTESINOS JR is a 55 year old male known to me with history of chronic 

kidney disease stage V secondary to diabetic nephropathy hypertensive 

nephrosclerosis with associated nephrotic range proteinuria and microscopic hem

aturia, diabetes mellitus type 2, hypertension, who was sent to the emergency 

room by Dr. Farmer because of severe hypertension.  Apparently the patient 

presented to Dr. Farmer's office yesterday with 4-5-day history of diarrhea 

and headache when he was found to have blood pressure of 225/125.  He was then 

sent to the emergency room.  Patient said that he has been tired and fatigued 

this prior to admission.  He has poor appetite.  He otherwise denies shortness 

of breath, chest pains, current nausea nor vomiting, nor pruritus.  He denies 

any urinary symptoms including dysuria, nor hematuria.  Upon admission patient 

was started on nitroglycerin drip.  Patient's blood pressure today has improved.





I saw the patient in the office 2019.  During that time he had a BUN

of 53, creatinine of 7.4 with EGFR of 8.  His kidney function has been 

progressively deteriorating for the last 2-3 months.  I then referred him to Dr. Martinez for placement of peritoneal dialysis catheter initiate peritoneal 

dialysis treatment.  The procedure has been delayed because the patient needed 

to have some further testing before he can go under general anesthesia.  Dr. Martinez ordered carotid ultrasound.  He also ordered cardiac evaluation so he 

was referred to Dr. De Luna due to abnormal EKG findings.  He had an 

echocardiogram on 2019 showed a dilated left ventricle and LVEF of 35%.

 He also underwent Lexiscan stress test on 2019 which showed no 

evidence of ischemia or scar.  Left ventricular ejection fraction of about 34-

35%.  Discussed the case with Dr. De Luna today he states that the patient 

has moderate risk for general anesthesia.





The patient's current kidney function include a BUN of 62, creatinine of 10.34 4

of 5.  He continues to have fatigue and poor appetite.  Blood pressures are 

currently improved.





Past Medical History


Cardiac Medical History: Reports: Hyperlipidemia, Hypertension-primary


Pulmonary Medical History: Reports: Asthma, Chronic Obstructive Pulmonary 

Disease (COPD)


EENT Medical History: Reports: Cataracts


Endocrine Medical History: Reports: Diabetes Mellitus Type 2 - He has diabetic 

retinopathy and nephropathy.  There is proteinuria


Complications of Diabetes: Reports: Autonomic Neuropathy, Nephropathy, 

Retinopathy


Renal/ Medical History: Reports: Chronic Kidney Disease Stage V, Hematuria, 

Hypocalcemia, Hyperkalemia, Hyperphosphatemia, Proteinuria, Renal Osteodystropy,

Secondary Hyperparathyroidism


Psychiatric Medical History: Reports: Depression


Hematology Medical History: Reports Anemia of Chronic Kidney Disease





Past Surgical History


Past Surgical History: Reports: Other - Cataract surgery





Social History


Information Source: Patient


Lives with: Parents


Smoking Status: Current Every Day Smoker


Cigarettes Packs Per Day: 0.3


Number of Years Smokin


Frequency of Alcohol Use: None


Hx Recreational Drug Use: No


Drugs: None


Hx Prescription Drug Abuse: No





Family History


Family History: CAD - Father, DM - Paternal grandfather, Hypertension - Father 

and mother


Parental Family History Reviewed: Yes


Children Family History Reviewed: NA


Sibling(s) Family History Reviewed.: Yes





Medication/Allergy


Home Medications: 








Furosemide [Lasix] 40 mg PO QAM 17 


Atorvastatin Calcium [Lipitor 40 mg Tablet] 40 mg PO QHS 19 


Carvedilol [Coreg 25 mg Tablet] 1 tab PO Q12 19 


Clopidogrel Bisulfate [Plavix] 75 mg PO DAILY 19 


Hydralazine HCl [Apresoline 50 mg Tablet] 50 mg PO TID 19 


Nifedipine [Nifedipine ER] 30 mg PO BID 19 


Paricalcitol 1 mcg PO MOWEFR 19 








Allergies/Adverse Reactions: 


                                        





No Known Allergies Allergy (Verified 18 10:00)


   











Review of Systems


Review of Systems: 





Constitutional: ABSENT: chills, fever(s), headache(s), weight gain, weight loss;

admits fatigue and poor appetite


Eyes: ABSENT: visual disturbances


Ears: ABSENT: hearing changes


Cardiovascular: ABSENT: chest pain, dyspnea on exertion, edema, orthropnea, 

palpitations


Respiratory: ABSENT: cough, dyspnea, hemoptysis


Gastrointestinal: ABSENT: abdominal pain, constipation, hematemesis, 

hematochezia, nausea, vomiting; admits diarrhea


Genitourinary: ABSENT: dysuria, hematuria


Musculoskeletal: ABSENT: joint swelling


Integumentary: ABSENT: rash, wounds


Neurological: ABSENT: abnormal gait, abnormal speech, confusion, dizziness, 

focal weakness, numbness, syncope; admits headache


Psychiatric: ABSENT: anxiety, depression


Endocrine: ABSENT: cold intolerance, heat intolerance, polydipsia, polyuria


Hematologic/Lymphatic: ABSENT: easy bleeding, easy bruising, lymphadenopathy





Physical Exam


Vital Signs: 


                                        











Temp Pulse Resp BP Pulse Ox


 


 97.6 F   61   16   118/55 L  99 


 


 19 19:43  19 19:43  19 19:43  19 19:43  19 19:43








                                 Intake & Output











 19





 06:59 06:59 06:59


 


Intake Total  97 304


 


Output Total   25


 


Balance  97 279


 


Weight  71.8 kg 











Exam: 





General appearance: No acute distress, cooperative, well-developed, well-nour

ished


Head exam: PRESENT: atraumatic, normocephalic


Eye exam: PRESENT: Conjunctiva Pink, EOMI, PERRLA.  ABSENT: conjunctival 

injection, scleral icterus


Mouth exam: PRESENT: moist, neck supple, tongue midline


Neck exam: PRESENT: full ROM.  ABSENT: carotid bruit, JVD, lymphadenopathy, 

thyromegaly


Respiratory exam: PRESENT: clear to auscultation bilaterally.  ABSENT: rales, 

rhonchi, stridor, wheezes


Cardiovascular exam: PRESENT: RRR, +S1, +S2.  ABSENT: systolic murmur


Pulses: PRESENT: normal radial pulses, normal dorsalis pedis pulses


GI/Abdominal exam: PRESENT: normal bowel sounds, soft.  ABSENT: guarding, mass, 

tenderness


Rectal exam: Deferred


Extremities exam: PRESENT: full ROM.  ABSENT: calf tenderness, pedal edema


Musculoskeletal: PRESENT: full ROM.  ABSENT: deformity


Neurological exam: PRESENT: alert, Awake, Oriented to person, Oriented to place,

Oriented to time, reflexes normal, CN II-XII grossly intact.  ABSENT: motor 

sensory deficit


Psychiatric exam: PRESENT: appropriate affect, normal mood.  ABSENT: homicidal 

ideation, suicidal ideation


Skin exam: PRESENT: intact, dry, warm.  ABSENT: rash





Results


Laboratory Results: 


                                        





                                 19 06:16 





                                 19 06:16 





                                        











  19





  19:39 21:25 06:16


 


WBC    2.5 L


 


RBC    3.42 L


 


Hgb    10.4 L


 


Hct    30.4 L


 


MCV    89


 


MCH    30.5


 


MCHC    34.4


 


RDW    15.6 H


 


Plt Count    83 L


 


Seg Neutrophils %    61.4


 


Lymphocytes %    30.3


 


Monocytes %    7.6


 


Eosinophils %    0.4


 


Basophils %    0.3


 


Absolute Neutrophils    1.5 L


 


Absolute Lymphocytes    0.7


 


Absolute Monocytes    0.2


 


Absolute Eosinophils    0.0


 


Absolute Basophils    0.0


 


Sodium   


 


Potassium   


 


Chloride   


 


Carbon Dioxide   


 


Anion Gap   


 


BUN   


 


Creatinine   


 


Est GFR ( Amer)   


 


Est GFR (Non-Af Amer)   


 


Glucose   


 


Calcium   


 


Phosphorus   


 


Total Bilirubin   


 


AST   


 


ALT   


 


Alkaline Phosphatase   


 


Total Protein   


 


Albumin   


 


Triglycerides   


 


Cholesterol   


 


LDL Cholesterol Direct   


 


VLDL Cholesterol   


 


HDL Cholesterol   


 


TSH  1.76  


 


Free T4  1.02  


 


Urine Color   YELLOW 


 


Urine Appearance   SLIGHTLY-CLOUDY 


 


Urine pH   5.0 


 


Ur Specific Gravity   1.015 


 


Urine Protein   >=500 H 


 


Urine Glucose (UA)   150 H 


 


Urine Ketones   NEGATIVE 


 


Urine Blood   SMALL H 


 


Urine Nitrite   NEGATIVE 


 


Ur Leukocyte Esterase   NEGATIVE 


 


Urine WBC (Auto)   22 


 


Urine RBC (Auto)   12 














  19





  06:16


 


WBC 


 


RBC 


 


Hgb 


 


Hct 


 


MCV 


 


MCH 


 


MCHC 


 


RDW 


 


Plt Count 


 


Seg Neutrophils % 


 


Lymphocytes % 


 


Monocytes % 


 


Eosinophils % 


 


Basophils % 


 


Absolute Neutrophils 


 


Absolute Lymphocytes 


 


Absolute Monocytes 


 


Absolute Eosinophils 


 


Absolute Basophils 


 


Sodium  136.7 L


 


Potassium  3.9


 


Chloride  109 H


 


Carbon Dioxide  17 L


 


Anion Gap  11


 


BUN  64 H


 


Creatinine  10.22 H


 


Est GFR ( Amer)  6 L


 


Est GFR (Non-Af Amer)  5 L


 


Glucose  107


 


Calcium  7.4 L


 


Phosphorus  6.8 H


 


Total Bilirubin  0.3


 


AST  19


 


ALT  28


 


Alkaline Phosphatase  71


 


Total Protein  5.4 L


 


Albumin  3.0 L


 


Triglycerides  115


 


Cholesterol  108.08


 


LDL Cholesterol Direct  53


 


VLDL Cholesterol  23.0


 


HDL Cholesterol  37 L


 


TSH 


 


Free T4 


 


Urine Color 


 


Urine Appearance 


 


Urine pH 


 


Ur Specific Gravity 


 


Urine Protein 


 


Urine Glucose (UA) 


 


Urine Ketones 


 


Urine Blood 


 


Urine Nitrite 


 


Ur Leukocyte Esterase 


 


Urine WBC (Auto) 


 


Urine RBC (Auto) 








                                        











  19





  19:39 19:39 19:39


 


Creatine Kinase  Cancelled   359 H


 


CK-MB (CK-2)   3.88 


 


Troponin I   0.077 


 


NT-Pro-B Natriuret Pep   39559 H 














  19





  01:18 01:18 06:16


 


Creatine Kinase  258 H   258 H


 


CK-MB (CK-2)   2.76 


 


Troponin I   0.075 


 


NT-Pro-B Natriuret Pep   














  19





  06:16


 


Creatine Kinase 


 


CK-MB (CK-2)  2.80


 


Troponin I  0.085


 


NT-Pro-B Natriuret Pep 











Impressions: 


                                        





Abdomen/Pelvis CT  19 00:00


IMPRESSION:


 


No acute abdominal or pelvic pathology.


 








Chest X-Ray  19 00:00


IMPRESSION:


 


No acute disease.


 








Head CT  19 00:00


IMPRESSION: 


 


1. No acute intracranial findings.


2. Chronic microvascular ischemic disease.


 














Assessment & Plan





- Diagnosis


(1) ESRD (end stage renal disease)


Is this a current diagnosis for this admission?: Yes   


Plan: 


Patient's kidney function has continuously deteriorated and now he is at the 

point that he is end-stage renal disease.  This is due to diabetic nephropathy 

and hypertensive nephrosclerosis.  Patient would need to be initiated for 

chronic renal replacement therapy and he chose to do peritoneal dialysis. I 

initially thought of doing acute hemodialysis treatment while waiting for 

peritoneal dialysis catheter placement however the patient insists that he needs

to be discharged home on Thursday because of a very important meeting in 

[place] on Friday and he will not stay for any reason because he will not 

needs that meeting.  So after discussion with Dr. De Luna, Dr. Martinez, and 

Dr. Farmer our plan is to discharge the patient home on Thursday after his 

blood pressure is better controlled.  He will follow up with Dr. De Luna for 

cardiac risk stratification prior to surgery for PD catheter placement on 

Monday.  Dr. Martinez agreed to put the patient on schedule for PD catheter 

placement on Tuesday.  Once the PD catheter is placed we will have the patient 

do an acute urgent start for peritoneal dialysis at [facility].  I discussed this 

plan with the patient and his mother who later came along.  Everybody is aware 

of the plan patient agreed.  At this point I think the patient can wait until 

next week prior to initiation of peritoneal dialysis treatment since there is no

urgent indication of emergent initiation of dialysis treatment.








(2) Uremia


Is this a current diagnosis for this admission?: Yes   


Plan: 


Starting mild symptoms including fatigue and poor appetite due to worsening 

kidney function.








(3) Hypertensive urgency


Is this a current diagnosis for this admission?: Yes   


Plan: 


Increase hydralazine to 100 mg p.o. every 8 hours and try to taper down the 

nitroglycerin drip with goal of systolic blood pressure between 140-160.  

Discussed this treatment plan with the patient's nurse today.  Continue all 

other medications.








(4) Metabolic acidosis


Is this a current diagnosis for this admission?: Yes   


Plan: 


Start sodium bicarbonate 650 mg p.o. twice daily which the patient would need to

continue upon discharge.








(5) Anemia in chronic kidney disease


Is this a current diagnosis for this admission?: Yes   


Plan: 


No need for Procrit at this time.








(6) Secondary hyperparathyroidism (of renal origin)


Is this a current diagnosis for this admission?: Yes   


Plan: 


Continue paricalcitol.








(7) Hyperphosphatemia


Is this a current diagnosis for this admission?: Yes   


Plan: 


Start calcium acetate 167 mg 2 capsules with meals 3 times a day.








(8) Hypomagnesemia


Is this a current diagnosis for this admission?: Yes   


Plan: 


Replace magnesium as necessary.








(9) Urinary tract infection


Is this a current diagnosis for this admission?: Yes   


Plan: 


Due to gram-negative rods.  Start ciprofloxacin 250 mg p.o. twice daily.








(10) Nephrotic range proteinuria


Is this a current diagnosis for this admission?: Yes   





(11) Type 2 diabetes mellitus


Qualifiers: 


 


Is this a current diagnosis for this admission?: Yes   


Plan: 


Well-controlled.








- Notes


Notes: 





Thank you very much for this consultation.  Plan discussed with Dr. Farmer, 

Dr. De Luna, Dr. Martinez, his nurse, the patient himself and his mother.





- Time


Time Spent: Greater than 70 Minutes

## 2019-03-13 NOTE — PDOC PROGRESS REPORT
Subjective


Progress Note for:: 03/13/19


Subjective:: 


Patient was seen by the bedside, he has pancytopenia, he may need bone marrow 

biopsy, consultation will be obtained from hematology


Reason For Visit: 


HYPERTENSIVE EMERGENCY, CKD STAGE 5/ESRD,T2DM








Physical Exam


Vital Signs: 


                                        











Temp Pulse Resp BP Pulse Ox


 


 97.2 F   61   17   144/71 H  99 


 


 03/13/19 15:06  03/13/19 15:06  03/13/19 15:06  03/13/19 15:06  03/13/19 15:06








                                 Intake & Output











 03/12/19 03/13/19 03/14/19





 06:59 06:59 06:59


 


Intake Total 97 440 473


 


Output Total  25 0


 


Balance 97 415 473


 


Weight 71.8 kg 72.2 kg 











General appearance: PRESENT: no acute distress


Eye exam: PRESENT: PERRLA


Cardiovascular exam: PRESENT: +S1, +S2, systolic murmur


GI/Abdominal exam: PRESENT: soft





Results


Laboratory Results: 


                                        





                                 03/13/19 05:32 





                                 03/13/19 05:32 





                                        











  03/13/19 03/13/19





  05:32 05:32


 


WBC  3.0 L 


 


RBC  3.41 L 


 


Hgb  10.5 L 


 


Hct  30.3 L 


 


MCV  89 


 


MCH  30.6 


 


MCHC  34.5 


 


RDW  15.5 H 


 


Plt Count  72 L 


 


Seg Neutrophils %  59.4 


 


Lymphocytes %  30.5 


 


Monocytes %  8.7 


 


Eosinophils %  1.1 


 


Basophils %  0.3 


 


Absolute Neutrophils  1.8 


 


Absolute Lymphocytes  0.9 


 


Absolute Monocytes  0.3 


 


Absolute Eosinophils  0.0 


 


Absolute Basophils  0.0 


 


Sodium   135.4 L


 


Potassium   3.6


 


Chloride   109 H


 


Carbon Dioxide   18 L


 


Anion Gap   8


 


BUN   67 H


 


Creatinine   10.84 H


 


Est GFR ( Amer)   6 L


 


Est GFR (Non-Af Amer)   5 L


 


Glucose   111 H


 


Calcium   7.4 L


 


Phosphorus   6.5 H


 


Magnesium   1.3 L


 


Total Bilirubin   0.3


 


AST   16 L


 


ALT   31


 


Alkaline Phosphatase   70


 


Total Protein   5.2 L


 


Albumin   2.8 L








                                        





03/11/19 21:25   Clean Catch Midstream   Urine Culture - Final


                            Pseudomonas Aeruginosa





                                        











  03/11/19 03/11/19 03/11/19





  19:39 19:39 19:39


 


Creatine Kinase  Cancelled   359 H


 


CK-MB (CK-2)   3.88 


 


Troponin I   0.077 


 


NT-Pro-B Natriuret Pep   36474 H 














  03/12/19 03/12/19 03/12/19





  01:18 01:18 06:16


 


Creatine Kinase  258 H   258 H


 


CK-MB (CK-2)   2.76 


 


Troponin I   0.075 


 


NT-Pro-B Natriuret Pep   














  03/12/19





  06:16


 


Creatine Kinase 


 


CK-MB (CK-2)  2.80


 


Troponin I  0.085


 


NT-Pro-B Natriuret Pep 











Impressions: 


                                        





Abdomen/Pelvis CT  03/11/19 00:00


IMPRESSION:


 


No acute abdominal or pelvic pathology.


 








Chest X-Ray  03/11/19 00:00


IMPRESSION:


 


No acute disease.


 








Head CT  03/11/19 00:00


IMPRESSION: 


 


1. No acute intracranial findings.


2. Chronic microvascular ischemic disease.


 














Assessment & Plan





- Diagnosis


(1) Hypertensive emergency


Is this a current diagnosis for this admission?: Yes   





(2) Uremia


Is this a current diagnosis for this admission?: Yes   





(3) ESRD (end stage renal disease)


Is this a current diagnosis for this admission?: Yes   





(4) Pancytopenia


Is this a current diagnosis for this admission?: Yes   





(5) Pseudomonas urinary tract infection


Is this a current diagnosis for this admission?: Yes   





(6) Anemia in chronic kidney disease


Is this a current diagnosis for this admission?: Yes   





(7) Secondary hyperparathyroidism (of renal origin)


Is this a current diagnosis for this admission?: Yes   





(8) Nephrotic range proteinuria


Is this a current diagnosis for this admission?: Yes

## 2019-03-13 NOTE — PDOC PROGRESS REPORT
Subjective


Progress Note for:: 03/13/19


Subjective:: 





Patient's blood pressure has been better controlled.  He said he feels better 

except for a mild headache.  He has been off the nitroglycerin drip since last 

night.  He does not have any other new complaints.


Reason For Visit: 


HYPERTENSIVE EMERGENCY, CKD STAGE 5/ESRD,T2DM








Physical Exam


Vital Signs: 


                                        











Temp Pulse Resp BP Pulse Ox


 


 97.6 F   64   20   141/62 H  98 


 


 03/13/19 19:33  03/13/19 19:33  03/13/19 19:33  03/13/19 19:33  03/13/19 19:33








                                 Intake & Output











 03/12/19 03/13/19 03/14/19





 06:59 06:59 06:59


 


Intake Total 97 440 710


 


Output Total  25 0


 


Balance 97 415 710


 


Weight 71.8 kg 72.2 kg 











Exam: 


General appearance: PRESENT: no acute distress, cooperative, well-developed, 

well-nourished


Head exam: PRESENT: atraumatic, normocephalic


Eye exam: PRESENT: conjunctiva pink, PERRLA.  ABSENT: scleral icterus


Neck exam: ABSENT: JVD


Respiratory exam: PRESENT: Normal breath sounds.  ABSENT: crackles, rales, 

rhonchi, unlabored, wheezes


Cardiovascular exam: PRESENT: Regular rate rhythm -+S1, +S2.  ABSENT: diastolic 

murmur, systolic murmur


GI/Abdominal exam: PRESENT: normal bowel sounds, soft.  ABSENT: guarding, mass, 

tenderness


Extremities exam: ABSENT: No edema


Neurological exam: PRESENT: alert, awake, oriented to person, place and time.


Skin exam: PRESENT: dry, warm,





Results


Laboratory Results: 


                                        





                                 03/13/19 05:32 





                                 03/13/19 05:32 





                                        











  03/13/19 03/13/19





  05:32 05:32


 


WBC  3.0 L 


 


RBC  3.41 L 


 


Hgb  10.5 L 


 


Hct  30.3 L 


 


MCV  89 


 


MCH  30.6 


 


MCHC  34.5 


 


RDW  15.5 H 


 


Plt Count  72 L 


 


Seg Neutrophils %  59.4 


 


Lymphocytes %  30.5 


 


Monocytes %  8.7 


 


Eosinophils %  1.1 


 


Basophils %  0.3 


 


Absolute Neutrophils  1.8 


 


Absolute Lymphocytes  0.9 


 


Absolute Monocytes  0.3 


 


Absolute Eosinophils  0.0 


 


Absolute Basophils  0.0 


 


Sodium   135.4 L


 


Potassium   3.6


 


Chloride   109 H


 


Carbon Dioxide   18 L


 


Anion Gap   8


 


BUN   67 H


 


Creatinine   10.84 H


 


Est GFR ( Amer)   6 L


 


Est GFR (Non-Af Amer)   5 L


 


Glucose   111 H


 


Calcium   7.4 L


 


Phosphorus   6.5 H


 


Magnesium   1.3 L


 


Total Bilirubin   0.3


 


AST   16 L


 


ALT   31


 


Alkaline Phosphatase   70


 


Total Protein   5.2 L


 


Albumin   2.8 L








                                        





03/11/19 21:25   Clean Catch Midstream   Urine Culture - Final


                            Pseudomonas Aeruginosa





                                        











  03/11/19 03/11/19 03/11/19





  19:39 19:39 19:39


 


Creatine Kinase  Cancelled   359 H


 


CK-MB (CK-2)   3.88 


 


Troponin I   0.077 


 


NT-Pro-B Natriuret Pep   97069 H 














  03/12/19 03/12/19 03/12/19





  01:18 01:18 06:16


 


Creatine Kinase  258 H   258 H


 


CK-MB (CK-2)   2.76 


 


Troponin I   0.075 


 


NT-Pro-B Natriuret Pep   














  03/12/19





  06:16


 


Creatine Kinase 


 


CK-MB (CK-2)  2.80


 


Troponin I  0.085


 


NT-Pro-B Natriuret Pep 











Impressions: 


                                        





Abdomen/Pelvis CT  03/11/19 00:00


IMPRESSION:


 


No acute abdominal or pelvic pathology.


 








Chest X-Ray  03/11/19 00:00


IMPRESSION:


 


No acute disease.


 








Head CT  03/11/19 00:00


IMPRESSION: 


 


1. No acute intracranial findings.


2. Chronic microvascular ischemic disease.


 














Assessment & Plan





- Diagnosis


(1) ESRD (end stage renal disease)


Is this a current diagnosis for this admission?: Yes   


Plan: 


Currently no indication for urgent renal replacement therapy.  Plan is to place 

peritoneal dialysis catheter on Tuesday and to start peritoneal dialysis on We

dnesday at [facility].








(2) Uremia


Is this a current diagnosis for this admission?: Yes   


Plan: 


Starting mild symptoms.








(3) Hypertensive urgency


Is this a current diagnosis for this admission?: Yes   


Plan: 


Improved with better blood pressure control with current blood pressure regimen.








(4) Metabolic acidosis


Is this a current diagnosis for this admission?: Yes   


Plan: 


Continue sodium bicarbonate upon discharge.








(5) Anemia in chronic kidney disease


Is this a current diagnosis for this admission?: Yes   





(6) Secondary hyperparathyroidism (of renal origin)


Is this a current diagnosis for this admission?: Yes   


Plan: 


On paricalcitol.








(7) Hyperphosphatemia


Is this a current diagnosis for this admission?: Yes   


Plan: 


On calcium acetate.








(8) Hypomagnesemia


Is this a current diagnosis for this admission?: Yes   


Plan: 


Continue magnesium oxide.








(9) Urinary tract infection


Is this a current diagnosis for this admission?: Yes   


Plan: 


Due to Pseudomonas sensitive to ciprofloxacin.








(10) Nephrotic range proteinuria


Is this a current diagnosis for this admission?: Yes   





(11) Type 2 diabetes mellitus


Qualifiers: 


 


Is this a current diagnosis for this admission?: Yes   





- Time


Time with patient: 15-25 minutes

## 2019-03-14 NOTE — PDOC DISCHARGE SUMMARY
General





- Admit/Disc Date/PCP


Admission Date/Primary Care Provider: 


  03/11/19 18:35





  CATHI FARMER MD





Discharge Date: 03/14/19





- Discharge Diagnosis


(1) Hypertensive emergency


Is this a current diagnosis for this admission?: Yes   





(2) Uremia


Is this a current diagnosis for this admission?: Yes   





(3) ESRD (end stage renal disease)


Is this a current diagnosis for this admission?: Yes   





(4) Pancytopenia


Is this a current diagnosis for this admission?: Yes   





(5) Pseudomonas urinary tract infection


Is this a current diagnosis for this admission?: Yes   





(6) Anemia in chronic kidney disease


Is this a current diagnosis for this admission?: Yes   





(7) Secondary hyperparathyroidism (of renal origin)


Is this a current diagnosis for this admission?: Yes   





(8) Nephrotic range proteinuria


Is this a current diagnosis for this admission?: Yes   





- Additional Information


Prescriptions: 


RX: Ciprofloxacin HCl [Cipro 500 mg Tablet] 250 mg PO Q12 #10 tablet


RX: Sodium Bicarbonate [Sodium Bicarbonate 650 mg Tablet] 650 mg PO Q12 #80 

tablet


Home Medications: 








RX: Furosemide [Lasix] 40 mg PO QAM 06/23/17 


RX: Atorvastatin Calcium [Lipitor 40 mg Tablet] 40 mg PO QHS 03/01/19 


RX: Carvedilol [Coreg 25 mg Tablet] 1 tab PO Q12 03/01/19 


RX: Clopidogrel Bisulfate [Plavix] 75 mg PO DAILY 03/01/19 


RX: Hydralazine HCl [Apresoline 50 mg Tablet] 50 mg PO TID 03/01/19 


RX: Nifedipine [Nifedipine ER] 30 mg PO BID 03/01/19 


RX: Paricalcitol 1 mcg PO MOWEFR 03/01/19 


RX: Acetaminophen [Tylenol 325 mg Tablet] 650 mg PO Q6HP PRN  tablet 03/14/19 


RX: Ciprofloxacin HCl [Cipro 500 mg Tablet] 250 mg PO Q12 #10 tablet 03/14/19 


RX: Sodium Bicarbonate [Sodium Bicarbonate 650 mg Tablet] 650 mg PO Q12 #80 

tablet 03/14/19 











History of Present Illness


History of Present Illness: 


CHOCO HILARIO MONTESINOS JR is a 55 year old male, He has end-stage renal disease from 

diabetes nephropathy he is presently being prepped for maintenance peritoneal d

ialysis, he came to the office today for evaluation of abdominal pain, diarrhea,

headache, malaise, denies sense of illness in the office the blood pressure 

recorded was 225/125, I felt patient needed to be admitted for further 

management.








Hospital Course


Hospital Course: 





Patient was admitted for the management of complications of end-stage renal 

disease including uremia, hypertensive emergency, he was treated with 

intravenous nitroglycerin infusion, consultation was obtained from nephrology 

for hemodialysis, but patient said he has to be discharged home today because he

has an arbitration to attend tomorrow Friday regarding his job.  He has end-sta

ge renal disease he was being prepared for peritoneal dialysis outpatient, the 

vascular surgeon that was supposed to implant the peritoneal catheter referred  

patient to cardiology for risk factor stratification, a 2D echo was done 

outpatient that demonstrated a low ejection fraction of left ventricle about 35%

he subsequently underwent a pharmacologic, Cardiolite Lexiscan stress test that 

demonstrated reversibility that  suggest ischemia.  The medication was adjusted,

he was also found to have pancytopenia I consulted hematology for possible bone 

marrow biopsy the platelet count was profoundly reduced it was in the 70,000, he

was seen by the hematologist she will follow patient outpatient since patient is

being discharged home today, he was seen today by the hematologist.  He also has

UTI due to Pseudomonas sensitive to ciprofloxacin.  Patient overall condition is

very poor but he is stable enough for discharge for him to attend  the 

arbitration appointment tomorrow.





Physical Exam


Vital Signs: 


                                        











Temp Pulse Resp BP Pulse Ox


 


 98.0 F   78   18   150/52 H  100 


 


 03/14/19 15:15  03/14/19 15:15  03/14/19 15:15  03/14/19 15:15  03/14/19 15:15








                                 Intake & Output











 03/13/19 03/14/19 03/15/19





 06:59 06:59 06:59


 


Intake Total 440 710 420


 


Output Total 25 0 550


 


Balance 415 710 -130


 


Weight 72.2 kg 72.9 kg 











General appearance: PRESENT: no acute distress


Eye exam: PRESENT: PERRLA


Respiratory exam: PRESENT: clear to auscultation nacho


Cardiovascular exam: PRESENT: +S1, +S2


GI/Abdominal exam: PRESENT: soft


Neurological exam: PRESENT: alert





Results


Laboratory Results: 


                                        





                                 03/14/19 06:16 





                                 03/14/19 06:16 





                                        











  03/14/19 03/14/19 03/14/19





  06:16 06:16 06:16


 


WBC  2.9 L  


 


RBC  3.50 L  


 


Hgb  10.8 L  


 


Hct  30.8 L  


 


MCV  88  


 


MCH  30.9  


 


MCHC  35.1  


 


RDW  15.4 H  


 


Plt Count  80 L  


 


Seg Neutrophils %  57.1  


 


Lymphocytes %  33.6  


 


Monocytes %  7.6  


 


Eosinophils %  1.3  


 


Basophils %  0.4  


 


Absolute Neutrophils  1.7  


 


Absolute Lymphocytes  1.0  


 


Absolute Monocytes  0.2  


 


Absolute Eosinophils  0.0  


 


Absolute Basophils  0.0  


 


Retic Count (auto)    0.48 L


 


Absolute Retic    0.017 L


 


Sodium   135.5 L 


 


Potassium   3.6 


 


Chloride   107 


 


Carbon Dioxide   17 L 


 


Anion Gap   12 


 


BUN   74 H 


 


Creatinine   11.03 H 


 


Est GFR ( Amer)   6 L 


 


Est GFR (Non-Af Amer)   5 L 


 


Glucose   116 H 


 


Calcium   7.7 L 


 


Phosphorus   6.1 H 


 


Iron   


 


TIBC   


 


% Saturation   


 


Ferritin   


 


Total Bilirubin   0.3 


 


AST   15 L 


 


ALT   25 


 


Alkaline Phosphatase   75 


 


Total Protein   5.4 L 


 


Albumin   3.0 L 


 


Vitamin B12   


 


Folate   














  03/14/19





  06:16


 


WBC 


 


RBC 


 


Hgb 


 


Hct 


 


MCV 


 


MCH 


 


MCHC 


 


RDW 


 


Plt Count 


 


Seg Neutrophils % 


 


Lymphocytes % 


 


Monocytes % 


 


Eosinophils % 


 


Basophils % 


 


Absolute Neutrophils 


 


Absolute Lymphocytes 


 


Absolute Monocytes 


 


Absolute Eosinophils 


 


Absolute Basophils 


 


Retic Count (auto) 


 


Absolute Retic 


 


Sodium 


 


Potassium 


 


Chloride 


 


Carbon Dioxide 


 


Anion Gap 


 


BUN 


 


Creatinine 


 


Est GFR ( Amer) 


 


Est GFR (Non-Af Amer) 


 


Glucose 


 


Calcium 


 


Phosphorus 


 


Iron  78.4


 


TIBC  237 L


 


% Saturation  33


 


Ferritin  167.00


 


Total Bilirubin 


 


AST 


 


ALT 


 


Alkaline Phosphatase 


 


Total Protein 


 


Albumin 


 


Vitamin B12  664.0


 


Folate  9.55








                                        











  03/11/19 03/11/19 03/11/19





  19:39 19:39 19:39


 


Creatine Kinase  Cancelled   359 H


 


CK-MB (CK-2)   3.88 


 


Troponin I   0.077 


 


NT-Pro-B Natriuret Pep   56266 H 














  03/12/19 03/12/19 03/12/19





  01:18 01:18 06:16


 


Creatine Kinase  258 H   258 H


 


CK-MB (CK-2)   2.76 


 


Troponin I   0.075 


 


NT-Pro-B Natriuret Pep   














  03/12/19





  06:16


 


Creatine Kinase 


 


CK-MB (CK-2)  2.80


 


Troponin I  0.085


 


NT-Pro-B Natriuret Pep 











Impressions: 


                                        





Abdomen/Pelvis CT  03/11/19 00:00


IMPRESSION:


 


No acute abdominal or pelvic pathology.


 








Chest X-Ray  03/11/19 00:00


IMPRESSION:


 


No acute disease.


 








Head CT  03/11/19 00:00


IMPRESSION: 


 


1. No acute intracranial findings.


2. Chronic microvascular ischemic disease.


 














Qualifiers





- *


PATIENT BEING DISCHARGED WITH ANY OF THE FOLLOWING DIAGNOSIS: No

## 2019-03-14 NOTE — PDOC CONSULTATION
Consultation


Consult Date: 19


Consult reason:: Hematology/Oncology consultation was requested for patient with

pancytopenia.





History of Present Illness


Admission Date/PCP: 


  19 18:35





  CATHI FARMER MD





History of Present Illness: 


CHOCO MONTESINOS JR is a 55 year old male who states that he presented to the [facility] for elevated blood pressure, but that has now resolved.  He is planning 

to be discharged from the hospital today and return for a catheter placement 

next Tu in preparation to begin peritoneal dialysis, due to kidney failure.  

He states that he has been anemic for a long time, but does not remember every 

having been told that his other blood counts were low, or having any work-up for

the anemia.  He does have a prior history of blood transfusion for the anemia.  

He has been on blood thinners for a CVA in the past.  He states that he gets 

dizzy every time they change his blood pressure medication.  Today, he is 

anxious to go home, but will offer no other complaints.  








Past Medical History


Cardiac Medical History: Reports: Hyperlipidema, Hypertension


   Denies: Coronary Artery Disease, Myocardial Infarction


Pulmonary Medical History: Reports: Asthma, Chronic Obstructive Pulmonary 

Disease (COPD)


   Denies: Bronchitis, Pneumonia


EENT Medical History: Reports: Cataracts


Neurological Medical History: 


   Denies: Seizures


Endocrine Medical History: Reports: Diabetes Mellitus Type 2 - He has diabetic 

retinopathy and nephropathy.  There is proteinuria


GI Medical History: 


   Denies: Hepatitis, Hiatal Hernia


Musculoskeltal Medical History: 


   Denies: Arthritis


Psychiatric Medical History: Reports: Depression


Hematology: Reports: Anemia


   Denies: Sickle Cell Disease





Past Surgical History


Past Surgical History: Reports: Other - Left leg for plate in his ankle, also 

has plates in his shoulder and hip.


   Denies: Pacemaker





Social History


Lives with: Parents


Smoking Status: Current Every Day Smoker


Cigarettes Packs Per Day: 0.3


Number of Years Smokin


Frequency of Alcohol Use: None


Hx Recreational Drug Use: No


Drugs: None


Hx Prescription Drug Abuse: No


Past Social History Note: 





He is single, has no children, but does have a dog at home.  He tells me he quit

smoking a week ago.  





Family History


Family History: CAD, COPD, DM, Hyperlipidemia, Hypertension


Parental Family History Reviewed: Yes


Children Family History Reviewed: NA


Sibling(s) Family History Reviewed.: Yes





Medication/Allergy


Home Medications: 








Furosemide [Lasix] 40 mg PO QAM 06/23/17 


Atorvastatin Calcium [Lipitor 40 mg Tablet] 40 mg PO QHS 19 


Carvedilol [Coreg 25 mg Tablet] 1 tab PO Q12 19 


Clopidogrel Bisulfate [Plavix] 75 mg PO DAILY 19 


Hydralazine HCl [Apresoline 50 mg Tablet] 50 mg PO TID 19 


Nifedipine [Nifedipine ER] 30 mg PO BID 19 


Paricalcitol 1 mcg PO MOWEFR 19 








Allergies/Adverse Reactions: 


                                        





No Known Allergies Allergy (Verified 18 10:00)


   











Review of Systems


Constitutional: ABSENT: fever(s), headache(s)


Eyes: ABSENT: visual disturbances


Ears: ABSENT: hearing changes


Nose, Mouth, and Throat: ABSENT: sore throat


Respiratory: PRESENT: cough


Gastrointestinal: PRESENT: heartburn


Genitourinary: ABSENT: dysuria, hematuria


Integumentary: ABSENT: rash


Neurological: PRESENT: dizziness


Hematologic/Lymphatic: ABSENT: easy bleeding





Physical Exam


Vital Signs: 


                                        











Temp Pulse Resp BP Pulse Ox


 


 97.9 F   68   20   154/62 H  99 


 


 19 04:00  19 07:00  19 04:00  19 04:00  19 04:00








                                 Intake & Output











 03/13/19 03/14/19 03/15/19





 06:59 06:59 06:59


 


Intake Total 440 710 


 


Output Total 25 0 


 


Balance 415 710 


 


Weight 72.2 kg 72.9 kg 











General appearance: PRESENT: well-developed, well-nourished


Head exam: PRESENT: normocephalic


Eye exam: PRESENT: EOMI


Mouth exam: PRESENT: moist, tongue midline


Neck exam: ABSENT: lymphadenopathy, tenderness


Respiratory exam: PRESENT: clear to auscultation nacho, unlabored


Cardiovascular exam: PRESENT: RRR.  ABSENT: systolic murmur


GI/Abdominal exam: PRESENT: soft.  ABSENT: organolmegaly, tenderness


Extremities exam: ABSENT: pedal edema


Neurological exam: PRESENT: alert, awake


Psychiatric exam: PRESENT: other - Very angry and short in all answers.


Skin exam: PRESENT: normal color





Results


Laboratory Results: 


                                        





                                 19 06:16 





                                 19 06:16 





                                        











  19





  06:16 06:16


 


WBC  2.9 L 


 


RBC  3.50 L 


 


Hgb  10.8 L 


 


Hct  30.8 L 


 


MCV  88 


 


MCH  30.9 


 


MCHC  35.1 


 


RDW  15.4 H 


 


Plt Count  80 L 


 


Seg Neutrophils %  57.1 


 


Lymphocytes %  33.6 


 


Monocytes %  7.6 


 


Eosinophils %  1.3 


 


Basophils %  0.4 


 


Absolute Neutrophils  1.7 


 


Absolute Lymphocytes  1.0 


 


Absolute Monocytes  0.2 


 


Absolute Eosinophils  0.0 


 


Absolute Basophils  0.0 


 


Sodium   135.5 L


 


Potassium   3.6


 


Chloride   107


 


Carbon Dioxide   17 L


 


Anion Gap   12


 


BUN   74 H


 


Creatinine   11.03 H


 


Est GFR ( Amer)   6 L


 


Est GFR (Non-Af Amer)   5 L


 


Glucose   116 H


 


Calcium   7.7 L


 


Phosphorus   6.1 H


 


Total Bilirubin   0.3


 


AST   15 L


 


ALT   25


 


Alkaline Phosphatase   75


 


Total Protein   5.4 L


 


Albumin   3.0 L








                                        





19 21:25   Clean Catch Midstream   Urine Culture - Final


                            Pseudomonas Aeruginosa





                                        











  19





  19:39 19:39 19:39


 


Creatine Kinase  Cancelled   359 H


 


CK-MB (CK-2)   3.88 


 


Troponin I   0.077 


 


NT-Pro-B Natriuret Pep   96752 H 














  19





  01:18 01:18 06:16


 


Creatine Kinase  258 H   258 H


 


CK-MB (CK-2)   2.76 


 


Troponin I   0.075 


 


NT-Pro-B Natriuret Pep   














  19





  06:16


 


Creatine Kinase 


 


CK-MB (CK-2)  2.80


 


Troponin I  0.085


 


NT-Pro-B Natriuret Pep 











Impressions: 


                                        





Abdomen/Pelvis CT  19 00:00


IMPRESSION:


 


No acute abdominal or pelvic pathology.


 








Chest X-Ray  19 00:00


IMPRESSION:


 


No acute disease.


 








Head CT  19 00:00


IMPRESSION: 


 


1. No acute intracranial findings.


2. Chronic microvascular ischemic disease.


 














Assessment & Plan





- Diagnosis


(1) Pancytopenia


Is this a current diagnosis for this admission?: Yes   


Plan: 


He has a long history of anemia, most likely anemia of chronic renal failure and

chronic disease.  Some work-up was obtained about a year ago, but I will re-

check iron, B12, Folate, LDH, SPEP, retic today.  The mild decrease in WBC and 

PLT are new with this admission.  However, he is on at least 2 medications that 

will suppress marrow function.  He is not currently neutropenic and his PLT 

remain >50, so I believe he is safe for peritoneal dialysis placement.  I would 

prefer to repeat CBC in 1-2 weeks to see if this improves after he has started 

dialysis and he is off antibiotics.  Pancytopenia may also be caused from his 

UTI.  He is growing pseudomonas currently.  May need bone marrow biopsy in the 

near future if no improvement in counts within the next few weeks.  








(2) ESRD (end stage renal disease)


Is this a current diagnosis for this admission?: Yes   


Plan: 


Dr. Bobo following and has planned peritoneal dialysis.  HBV, HCV pending.  I

will also check HIV, as this is another possible cause of pancytopenia.  








(3) Hypertensive urgency


Is this a current diagnosis for this admission?: Yes   


Plan: 


Appears to have resolved with new medications.  








- Plan Summary


Plan Summary: 





Patient was discussed with Dr. Cunningham. I will be happy to follow patient in 

office after discharge.

## 2019-03-14 NOTE — PDOC PROGRESS REPORT
Subjective


Progress Note for:: 03/14/19


Subjective:: 





Patient is doing fine except for mild headache.  His blood pressure is better 

controlled and in fact is a little bit lower than her goal today.  I advised 

patient is to help to take his medications consistently at the same time every 

day.


For the plan for dialysis there has been a change.  Dr. Martinez called me this 

morning and realized that the patient has an abdominal hernia that needs to be 

fixed at the same time of the peritoneal dialysis catheter therefore the patient

cannot do an acute urgent start for peritoneal dialysis.  Therefore we will 

start the patient on hemodialysis instead.  So he will keep his appointment with

Dr. De Luna on Monday.  Then he will have a PermCath inserted on Tuesday and 

the peritoneal dialysis catheter to be placed at a later time with abdominal 

hernia repair per Dr. Martinez.  Then he will be started on outpatient hem

odialysis at [facility] on Wednesday.  I talked to the patient and his 

mother at bedside regarding the plan and they both understood and agreed.


Reason For Visit: 


HYPERTENSIVE EMERGENCY, CKD STAGE 5/ESRD,T2DM








Physical Exam


Vital Signs: 


                                        











Temp Pulse Resp BP Pulse Ox


 


 97.9 F   71   15   121/49 L  97 


 


 03/14/19 08:04  03/14/19 08:04  03/14/19 08:04  03/14/19 08:04  03/14/19 08:04








                                 Intake & Output











 03/13/19 03/14/19 03/15/19





 06:59 06:59 06:59


 


Intake Total 440 710 


 


Output Total 25 0 


 


Balance 415 710 


 


Weight 72.2 kg 72.9 kg 











Exam: 





General appearance: PRESENT: no acute distress, cooperative, well-developed, 

well-nourished


Head exam: PRESENT: atraumatic, normocephalic


Eye exam: PRESENT: conjunctiva pink, PERRLA.  ABSENT: scleral icterus


Neck exam: ABSENT: JVD


Respiratory exam: PRESENT: Normal breath sounds.  ABSENT: crackles, rales, 

rhonchi, unlabored, wheezes


Cardiovascular exam: PRESENT: Regular rate rhythm -+S1, +S2.  ABSENT: diastolic 

murmur, systolic murmur


GI/Abdominal exam: PRESENT: normal bowel sounds, soft.  ABSENT: guarding, mass, 

tenderness


Extremities exam: ABSENT: No edema


Neurological exam: PRESENT: alert, awake, oriented to person, place and time.


Skin exam: PRESENT: dry, warm,





Results


Laboratory Results: 


                                        





                                 03/14/19 06:16 





                                 03/14/19 06:16 





                                        











  03/14/19 03/14/19 03/14/19





  06:16 06:16 06:16


 


WBC  2.9 L  


 


RBC  3.50 L  


 


Hgb  10.8 L  


 


Hct  30.8 L  


 


MCV  88  


 


MCH  30.9  


 


MCHC  35.1  


 


RDW  15.4 H  


 


Plt Count  80 L  


 


Seg Neutrophils %  57.1  


 


Lymphocytes %  33.6  


 


Monocytes %  7.6  


 


Eosinophils %  1.3  


 


Basophils %  0.4  


 


Absolute Neutrophils  1.7  


 


Absolute Lymphocytes  1.0  


 


Absolute Monocytes  0.2  


 


Absolute Eosinophils  0.0  


 


Absolute Basophils  0.0  


 


Retic Count (auto)    0.48 L


 


Absolute Retic    0.017 L


 


Sodium   135.5 L 


 


Potassium   3.6 


 


Chloride   107 


 


Carbon Dioxide   17 L 


 


Anion Gap   12 


 


BUN   74 H 


 


Creatinine   11.03 H 


 


Est GFR ( Amer)   6 L 


 


Est GFR (Non-Af Amer)   5 L 


 


Glucose   116 H 


 


Calcium   7.7 L 


 


Phosphorus   6.1 H 


 


Iron   


 


TIBC   


 


% Saturation   


 


Ferritin   


 


Total Bilirubin   0.3 


 


AST   15 L 


 


ALT   25 


 


Alkaline Phosphatase   75 


 


Total Protein   5.4 L 


 


Albumin   3.0 L 


 


Vitamin B12   


 


Folate   














  03/14/19





  06:16


 


WBC 


 


RBC 


 


Hgb 


 


Hct 


 


MCV 


 


MCH 


 


MCHC 


 


RDW 


 


Plt Count 


 


Seg Neutrophils % 


 


Lymphocytes % 


 


Monocytes % 


 


Eosinophils % 


 


Basophils % 


 


Absolute Neutrophils 


 


Absolute Lymphocytes 


 


Absolute Monocytes 


 


Absolute Eosinophils 


 


Absolute Basophils 


 


Retic Count (auto) 


 


Absolute Retic 


 


Sodium 


 


Potassium 


 


Chloride 


 


Carbon Dioxide 


 


Anion Gap 


 


BUN 


 


Creatinine 


 


Est GFR ( Amer) 


 


Est GFR (Non-Af Amer) 


 


Glucose 


 


Calcium 


 


Phosphorus 


 


Iron  78.4


 


TIBC  237 L


 


% Saturation  33


 


Ferritin  167.00


 


Total Bilirubin 


 


AST 


 


ALT 


 


Alkaline Phosphatase 


 


Total Protein 


 


Albumin 


 


Vitamin B12  664.0


 


Folate  9.55








                                        





03/11/19 21:25   Clean Catch Midstream   Urine Culture - Final


                            Pseudomonas Aeruginosa





                                        











  03/11/19 03/11/19 03/11/19





  19:39 19:39 19:39


 


Creatine Kinase  Cancelled   359 H


 


CK-MB (CK-2)   3.88 


 


Troponin I   0.077 


 


NT-Pro-B Natriuret Pep   12573 H 














  03/12/19 03/12/19 03/12/19





  01:18 01:18 06:16


 


Creatine Kinase  258 H   258 H


 


CK-MB (CK-2)   2.76 


 


Troponin I   0.075 


 


NT-Pro-B Natriuret Pep   














  03/12/19





  06:16


 


Creatine Kinase 


 


CK-MB (CK-2)  2.80


 


Troponin I  0.085


 


NT-Pro-B Natriuret Pep 











Impressions: 


                                        





Abdomen/Pelvis CT  03/11/19 00:00


IMPRESSION:


 


No acute abdominal or pelvic pathology.


 








Chest X-Ray  03/11/19 00:00


IMPRESSION:


 


No acute disease.


 








Head CT  03/11/19 00:00


IMPRESSION: 


 


1. No acute intracranial findings.


2. Chronic microvascular ischemic disease.


 














Assessment & Plan





- Diagnosis


(1) ESRD (end stage renal disease)


Is this a current diagnosis for this admission?: Yes   


Plan: 


Currently no indication for urgent renal replacement therapy.  Plan is changed 

to hemodialysis next week instead of peritoneal dialysis as stated above.  

Patient will have PermCath placed as an outpatient on Tuesday and to start 

outpatient hemodialysis on Wednesday.  Patient needs to be at [facility] 

at 2:30 PM on Wednesday.  Discharge planner has to send paperwork to [facility] today prior to discharge including hepatitis results and PPD.








(2) Uremia


Is this a current diagnosis for this admission?: Yes   


Plan: 


Starting mild symptoms.  I think he can still wait for starting hemodialysis 

next week.








(3) Hypertensive urgency


Is this a current diagnosis for this admission?: Yes   


Plan: 


Improved with current home blood pressure medications.  Decrease back his 

hydralazine to 50 mg p.o. every 8 hours.  Advised patient to take his me

dications consistently every day for it to work.  Patient and mother understood.








(4) Metabolic acidosis


Is this a current diagnosis for this admission?: Yes   


Plan: 


Continue sodium bicarbonate upon discharge.








(5) Anemia in chronic kidney disease


Is this a current diagnosis for this admission?: Yes   





(6) Secondary hyperparathyroidism (of renal origin)


Is this a current diagnosis for this admission?: Yes   


Plan: 


On paricalcitol.








(7) Hyperphosphatemia


Is this a current diagnosis for this admission?: Yes   


Plan: 


On calcium acetate.








(8) Hypomagnesemia


Is this a current diagnosis for this admission?: Yes   


Plan: 


Continue magnesium oxide.








(9) Urinary tract infection


Is this a current diagnosis for this admission?: Yes   


Plan: 


Due to Pseudomonas sensitive to ciprofloxacin.








(10) Nephrotic range proteinuria


Is this a current diagnosis for this admission?: Yes   





(11) Type 2 diabetes mellitus


Qualifiers: 


 


Is this a current diagnosis for this admission?: Yes   





(12) Pancytopenia


Is this a current diagnosis for this admission?: Yes   


Plan: 


Dr. Triana following patient for this.








- Notes


Notes: 





Plan of treatment discussed with the patient, his mother, Dr. Martinez and the 

patient's nurse today.  From nephrology standpoint patient can be discharged 

safely today and to keep all the appointments stated above.





- Time


Time with patient: Greater than 35 minutes

## 2019-03-16 NOTE — ER DOCUMENT REPORT
ED Medical Screen (RME)





- General


Chief Complaint: Nausea/Vomiting/Diarrhea


Stated Complaint: VOMITING


Time Seen by Provider: 03/16/19 09:37


Primary Care Provider: 


CATHI FARMER MD [Primary Care Provider] - Follow up as needed


Notes: 





RAPID MEDICAL EVALUATION DISCLOSURE


I have seen this patient as part of a Rapid Medical Evaluation and, if 

applicable, placed any initially appropriate orders. The patient will be seen 

and fully evaluated, including a full history and physical exam, by a provider 

(in Main ED or Fast Track) when a room becomes available.





------------------------------------------------------------------





55-year-old male recently discharged from the hospital for acute renal failure 

and hypertensive crisis here with complaints of continued nausea vomiting 

diarrhea and abdominal pain that started back up again yesterday.  He states he 

does not know if he has been able to keep down his blood pressure medication and

that at home his blood pressure was measured to be 249 systolic.  The symptoms 

are not new and were present during his entire hospitalization.





EXAM


CTAB


RRR


Diffuse upper > lower quadrant TTP


No peritoneal signs








TRAVEL OUTSIDE OF THE [place] IN LAST 30 DAYS: No





- Related Data


Allergies/Adverse Reactions: 


                                        





No Known Allergies Allergy (Verified 03/16/19 09:23)


   











Past Medical History





- Past Medical History


Cardiac Medical History: Reports: Hx Hypercholesterolemia, Hx Hypertension


   Denies: Hx Coronary Artery Disease, Hx Heart Attack


Pulmonary Medical History: Reports: Hx Asthma, Hx COPD


   Denies: Hx Bronchitis, Hx Pneumonia


Neurological Medical History: Denies: Hx Cerebrovascular Accident, Hx Seizures


Endocrine Medical History: Reports: Hx Diabetes Mellitus Type 2 - He has 

diabetic retinopathy and nephropathy.  There is proteinuria


Renal/ Medical History: Reports: Hx Renal Insufficiency.  Denies: Hx 

Peritoneal Dialysis


GI Medical History: Denies: Hx Hepatitis, Hx Hiatal Hernia, Hx Ulcer


Musculoskeltal Medical History: Denies Hx Arthritis


Psychiatric Medical History: Reports: Hx Depression


Infectious Medical History: Denies: Hx Hepatitis


Past Surgical History: Reports: Other - Left leg for plate in his ankle, also 

has plates in his shoulder and hip..  Denies: Hx Open Heart Surgery, Hx 

Pacemaker





- Immunizations


Hx Diphtheria, Pertussis, Tetanus Vaccination: Yes





Physical Exam





- Vital signs


Vitals: 





                                        











Temp Pulse Resp BP Pulse Ox


 


 97.4 F   79   18   169/94 H  98 


 


 03/16/19 09:27  03/16/19 09:27  03/16/19 09:27  03/16/19 09:27  03/16/19 09:27














Course





- Vital Signs


Vital signs: 





                                        











Temp Pulse Resp BP Pulse Ox


 


 97.4 F   79   18   169/94 H  98 


 


 03/16/19 09:27  03/16/19 09:27  03/16/19 09:27  03/16/19 09:27  03/16/19 09:27














Doctor's Discharge





- Discharge


Referrals: 


CATHI FARMER MD [Primary Care Provider] - Follow up as needed

## 2019-03-16 NOTE — RADIOLOGY REPORT (SQ)
EXAM DESCRIPTION:  CT HEAD WITHOUT



COMPLETED DATE/TIME:  3/16/2019 2:23 pm



REASON FOR STUDY:  headache and nausea



COMPARISON:  3/11/2019



TECHNIQUE:  Axial images acquired through the brain without intravenous contrast.  Images reviewed wi
th bone, brain and subdural windows.  Additional sagittal and coronal reconstructions were generated.
 Images stored on PACS.

All CT scanners at this facility use dose modulation, iterative reconstruction, and/or weight based d
osing when appropriate to reduce radiation dose to as low as reasonably achievable (ALARA).

[facility]: Dose Right  [facility]: CareDose    [facility]: Dose Right    [facility]: Teradose 4D    [facility]: [employer]



RADIATION DOSE:  CT Rad equipment meets quality standard of care and radiation dose reduction techniq
ues were employed. CTDIvol: 53.2 mGy. DLP: 991 mGy-cm. mGy.



LIMITATIONS:  None.



FINDINGS:  VENTRICLES: Normal size and configuration

CEREBRUM: No masses.  No hemorrhage.  No midline shift.  Areas of low density in the white matter mos
t likely due to chronic micro-vascular ischemic change.  No evidence for acute infarction.

CEREBELLUM: No masses.  No hemorrhage.  No alteration of density.  No evidence for acute infarction.

EXTRAAXIAL SPACES: No fluid collections.  No masses.

ORBITS AND GLOBE: No intra- or extraconal masses.  Normal contour of globe without masses.

CALVARIUM: No fracture.

PARANASAL SINUSES: No fluid or mucosal thickening.

SOFT TISSUES: No mass or hematoma.

OTHER: No other significant finding.



IMPRESSION:  No acute findings.  Background of chronic microvascular ischemic changes.

EVIDENCE OF ACUTE STROKE: NO.



TECHNICAL DOCUMENTATION:  JOB ID:  [id number]

Quality ID # [id number]: Final reports with documentation of one or more dose reduction techniques (e.g., Au
tomated exposure control, adjustment of the mA and/or kV according to patient size, use of iterative 
reconstruction technique)

 2011 "[employer]- All Rights Reserved



Reading location - IP/workstation name: [email or web address]

## 2019-03-16 NOTE — ER DOCUMENT REPORT
ED General





- General


Chief Complaint: Nausea/Vomiting/Diarrhea


Stated Complaint: VOMITING


Time Seen by Provider: 03/16/19 09:37


Primary Care Provider: 


CATHI FARMER MD [Primary Care Provider] - Follow up as needed


Notes: 


55-year-old male recently discharged from the hospital for acute renal failure 

and hypertensive crisis here this past Thursday with with CAD, hypertension, 

ESRD who will start dialysis this week, and a CVA in September 2018 presents to 

the emergency department with complaints of continued nausea, vomiting, 

diarrhea, and abdominal pain that started back up again yesterday.  He states he

does not know if he has been able to keep down his blood pressure medication and

that at home his blood pressure was measured to be 249 systolic.  The symptoms 

are not new and were present during his entire hospitalization.  He states he 

cannot sleep, generalized weakness, dizziness, lightheadedness, has no appetite,

complains of nausea and vomiting that is intractable, diarrhea.  He makes urine.

 No sick contacts. 


TRAVEL OUTSIDE OF THE [place] IN LAST 30 DAYS: No





- Related Data


Allergies/Adverse Reactions: 


                                        





No Known Allergies Allergy (Verified 03/16/19 09:23)


   











Past Medical History





- Social History


Smoking Status: Former Smoker


Family History: CAD, COPD, DM, Hyperlipidemia, Hypertension


Patient has suicidal ideation: No


Patient has homicidal ideation: No





- Past Medical History


Cardiac Medical History: Reports: Hx Hypercholesterolemia, Hx Hypertension


   Denies: Hx Coronary Artery Disease, Hx Heart Attack


Pulmonary Medical History: Reports: Hx Asthma, Hx COPD


   Denies: Hx Bronchitis, Hx Pneumonia


Neurological Medical History: Denies: Hx Cerebrovascular Accident, Hx Seizures


Endocrine Medical History: Reports: Hx Diabetes Mellitus Type 2 - He has 

diabetic retinopathy and nephropathy.  There is proteinuria


Renal/ Medical History: Reports: Hx Renal Insufficiency.  Denies: Hx 

Peritoneal Dialysis


GI Medical History: Denies: Hx Hepatitis, Hx Hiatal Hernia, Hx Ulcer


Musculoskeletal Medical History: Denies Hx Arthritis


Psychiatric Medical History: Reports: Hx Depression


Infectious Medical History: Denies: Hx Hepatitis


Past Surgical History: Reports: Hx Orthopedic Surgery, Other - Left leg for 

plate in his ankle, also has plates in his shoulder and hip..  Denies: Hx Open 

Heart Surgery, Hx Pacemaker





- Immunizations


Hx Diphtheria, Pertussis, Tetanus Vaccination: Yes





Review of Systems





- Review of Systems


Constitutional: See HPI


EENT: See HPI


Cardiovascular: See HPI


Respiratory: See HPI


Gastrointestinal: See HPI


Genitourinary: See HPI


Male Genitourinary: No symptoms reported


Musculoskeletal: No symptoms reported


Skin: No symptoms reported


Hematologic/Lymphatic: No symptoms reported


Neurological/Psychological: No symptoms reported





Physical Exam





- Vital signs


Vitals: 


                                        











Temp Pulse Resp BP Pulse Ox


 


 97.4 F   79   18   169/94 H  98 


 


 03/16/19 09:27  03/16/19 09:27  03/16/19 09:27  03/16/19 09:27  03/16/19 09:27














- Notes


Notes: 





PHYSICAL EXAMINATION:





Reviewed vital signs and charting by RN





GENERAL: Alert, interacts well. Mild distress.


HEAD: Normocephalic, atraumatic.


EYES: Pupils equal, round. Extraocular movements intact.


ENT: Oral mucosa moist, tongue midline. 


NECK: Full range of motion. Supple. Trachea midline.


LUNGS: Clear to auscultation bilaterally, no wheezes, rales, or rhonchi. No 

respiratory distress.


HEART: Regular rate and rhythm. No murmur


ABDOMEN: soft, mild tenderness left lower quadrant. Non-distended. Bowel sounds 

present in all 4 quadrants. no McBurney's point tenderness, no Torres sign.


EXTREMITIES: Moves all 4 extremities spontaneously. No edema,  No cyanosis.  

Week.


NEUROLOGICAL: Alert and oriented x3. Normal speech. 


PSYCH: Normal affect, normal mood.


SKIN: Warm, dry, normal turgor. No rashes or lesions noted.





Course





- Re-evaluation


Re-evalutation: 





03/16/19 13:00


Patient appears in mild distress.  All lab work is normal with the exception of 

his creatinine which was 11.96 and known that he is ESRD.  He does still make 

urine.  Talk with Dr. Paz, on-call physician for Dr. Farmer's group, who 

said we can give him WelChol 3.75 mg daily.  Patient has no indications of 

dehydration or infectious symptoms as his white count has increased from 2.94 

days ago to 7.1 today.  I will discussed with patient and prescribe him the 

WelChol and give him some Zofran for nausea.


03/16/19 13:48


Spoke with Dr. Mari, patient's nephrologist who recommended we do acutely 

treat the hypertension.  I will give hydralazine 10 mg IV 1 time.  She states 

she is not clear why the patient is hypertensive as when he was admitted and 

taking the exact same antihypertensive regimen his blood pressure was 

normalized.  She suspects that the patient may not be compliant at home with his

blood pressure meds.  Also, because patient has an unremitting headache I 

ordered a CT head without contrast.


03/16/19 14:59


CT head negative for intracranial bleed or any evidence of ischemia.  Patient 

states headache is improved after taking some Tylenol.  States antiemetics did 

help.  I will send him home with some Zofran that he can take.  Repeat blood 

pressure after hydralazine IV is 200/67.  This is an improvement from 222/84.  

Will monitor for short period, recycled blood pressure a couple of times, make a

disposition.


03/16/19 16:51


Revisited Dr. Paz as patient's blood pressure is persistently above 200 

systolic.  Stated I was not comfortable discharging him with an elevated blood 

pressure like that.  He recommended ordering hydralazine 100 mg p.o. but the 

nurse reported that his blood pressure was down to 160 systolic.  Repeat blood p

ressure was 155/75, this is approximately 1 hour after taking his afternoon p.o.

medications.  At this time patient is feeling much better and is safe and stable

discharge home.  I instructed him to follow-up Tuesday with his nephrologist for

the port.








- Vital Signs


Vital signs: 


                                        











Temp Pulse Resp BP Pulse Ox


 


 97.4 F   79   15   155/75 H  98 


 


 03/16/19 09:27  03/16/19 09:27  03/16/19 17:33  03/16/19 17:33  03/16/19 17:33














- Laboratory


Result Diagrams: 


                                 03/16/19 09:50





                                 03/16/19 09:50


Laboratory results interpreted by me: 


                                        











  03/16/19 03/16/19





  09:50 09:50


 


RBC  4.17 L 


 


Hgb  12.6 L 


 


Hct  36.7 L 


 


RDW  15.5 H 


 


Seg Neutrophils %  88.3 H 


 


Lymphocytes %  6.6 L 


 


BUN   83 H


 


Creatinine   11.96 H


 


Est GFR ( Amer)   5 L


 


Est GFR (Non-Af Amer)   4 L


 


Glucose   130 H














Discharge





- Discharge


Clinical Impression: 


 ESRD (end stage renal disease)





Vomiting


Qualifiers:


 Vomiting type: unspecified Vomiting Intractability: non-intractable Nausea 

presence: without nausea Qualified Code(s): R11.11 - Vomiting without nausea





Diarrhea


Qualifiers:


 Diarrhea type: unspecified type Qualified Code(s): R19.7 - Diarrhea, 

unspecified





Condition: Good


Disposition: HOME, SELF-CARE


Additional Instructions: 


You are seen in the emergency department this afternoon for nausea, vomiting, 

diarrhea.  All your lab work show that you are not dehydrated and clinically 

appeared well.  Also, you were given some antinausea medications which you 

stated improved your symptoms.  Also, your blood pressure was very high.  It is 

very important that you take your blood pressure medications as prescribed to 

maintain a blood pressure within a safe range for your kidneys and your heart.  

If you develop any severe chest pain, altered mental status, you pass out, 

numbness or tingling in any of your extremities, swelling of both your legs that

is abrupt in nature, please immediately return to the emergency department.


Referrals: 


CATHI FARMER MD [Primary Care Provider] - Follow up as needed

## 2019-03-19 NOTE — DISCHARGE SUMMARY
Discharge Summary (SDC)





- Discharge


Final Diagnosis: 





#1 end-stage renal disease requiring hemodialysis.





2.  Diabetes mellitus.





3.  Hypertension


Date of Surgery: 03/19/19


Discharge Date: 03/19/19


Condition: Good


Treatment or Instructions: 


Discharge home [after recovery per ASU criteria].


Diet , [renal],as tolerated, when fully awake advance as tolerated. 


Activities within moderation encouraged.


Follow up in my office by appointment in about [1 week]. Call for appointment.


Leave wounds [covered], [keep clean and dry, until office visit in 1 week].  


Please note Percocet was entered in error.  The prescription was destroyed.  

Pain medication will be over-the-counter only.


Meds per med rec.


Hold of on school/work [until evaluation in office].


May shower [in 48 hrs], [try to keep operated area as dry as possible].


Prescriptions: 


Oxycodone HCl/Acetaminophen [Percocet 5-325 mg Tablet] 1 tab PO ASDIR PRN #15 

tab


 PRN Reason: 


Referrals: 


CATHI FARMER MD [Primary Care Provider] - 


Discharge Diet: Diabetic, Other (Comments) - .  Renal.


Respiratory Treatments at Home: Deep Breathing/Coughing


Discharge Activity: Activity As Tolerated


Report the Following to Your Physician Immediately: Shortness of Breath, Unusual

Bleeding

## 2019-03-19 NOTE — RADIOLOGY REPORT (SQ)
EXAM DESCRIPTION:  TUNNELED CENTRAL LINE



COMPLETED DATE/TIME:  3/19/2019 2:36 pm



REASON FOR STUDY:  NEED FOR VASCULAR ACCESS N18.9  CHRONIC KIDNEY DISEASE, UNSPECIFIED



COMPARISON:  None.



FLUOROSCOPY TIME:  0.4 minutes

Spot images saved to PACS.



TECHNIQUE:  Intra-operative images acquired during surgical procedure to evaluate progress.

NUMBER OF IMAGES: 17



LIMITATIONS:  None.



FINDINGS:  Fluoroscopy was provided for intraoperative procedure.  Please refer to the operative repo
rt for further discussion.



IMPRESSION:  IMAGE(S) OBTAINED DURING PROCEDURE.



COMMENT:  Quality :  Final reports for procedures using fluoroscopy that document radiation exp
osure indices, or exposure time and number of fluorographic images (if radiation exposure indices are
 not available)

Please consult full operative report of the attending physician for description of the procedure.



TECHNICAL DOCUMENTATION:  JOB ID:  [id number]

 2011 [employer] All Rights Reserved



Reading location - IP/workstation name: [email or web address]

## 2019-03-19 NOTE — DISCHARGE SUMMARY
Discharge Summary (SDC)





- Discharge


Final Diagnosis: 





#1 end-stage renal disease on hemodialysis.





2.  PermCath in place.





3.  Diabetes mellitus type 2.





4.  Peripheral vascular disease.





5.  Hypertension.


Date of Surgery: 03/19/19


Discharge Date: 03/19/19


Condition: Poor


Treatment or Instructions: 


Discharge home [after recovery per ASU criteria].


Diet , [renal],as tolerated, when fully awake advance as tolerated. 


Activities within moderation encouraged.


Follow up in my office by appointment in about [1 week]. Call for appointment.


Leave wounds [covered], [keep clean and dry, until office visit in 1 week].  


Empty STEPHANIE drain as needed.


Meds per med rec.  Percocet.


Hold of on school/work [until evaluation in office].


May shower [in 48 hrs], [try to keep operated area as dry as possible].


Prescriptions: 


Oxycodone HCl/Acetaminophen [Percocet 5-325 mg Tablet] 1 tab PO ASDIR PRN #15 

tab


 PRN Reason: 


Referrals: 


CATHI FARMER MD [Primary Care Provider] - 


Discharge Diet: Other (Comments) - Diabetic, renal.


Respiratory Treatments at Home: Deep Breathing/Coughing


Discharge Activity: Activity As Tolerated


Report the Following to Your Physician Immediately: Shortness of Breath, Unusual

Bleeding

## 2019-03-19 NOTE — EKG REPORT
SEVERITY:- ABNORMAL ECG -

SINUS RHYTHM

ABNORMAL T, CONSIDER ISCHEMIA, LATERAL LEADS

PROLONGED QT INTERVAL

:

Confirmed by: Brown Turcios 19-Mar-2019 17:10:59

## 2019-03-19 NOTE — RADIOLOGY REPORT (SQ)
EXAM DESCRIPTION:  CHEST SINGLE VIEW



COMPLETED DATE/TIME:  3/19/2019 11:31 am



REASON FOR STUDY:  PREOP



COMPARISON:  3/11/2019



EXAM PARAMETERS:  NUMBER OF VIEWS: One view.

TECHNIQUE: Single frontal radiographic view of the chest acquired.

RADIATION DOSE: NA

LIMITATIONS: None.



FINDINGS:  LUNGS AND PLEURA: No opacities, masses or pneumothorax. No pleural effusion.

MEDIASTINUM AND HILAR STRUCTURES: No masses.  Contour normal.

HEART AND VASCULAR STRUCTURES:  Cardiomegaly suggested.  Normal vasculature.

BONES: No acute findings.

HARDWARE: None in the chest.

OTHER: No other significant finding.



IMPRESSION:  1. NO ACUTE RADIOGRAPHIC FINDING IN THE CHEST.

2.  Cardiomegaly suggested.  No evidence for failure.



TECHNICAL DOCUMENTATION:  JOB ID:  [id number]

 2011 [employer]- All Rights Reserved



Reading location - IP/workstation name: [email or web address]

## 2019-03-19 NOTE — OPERATIVE REPORT
Operative Report


DATE OF SURGERY: 03/19/19


PREOPERATIVE DIAGNOSIS: #1 end-stage renal disease requiring hemodialysis.  2.  

Diabetes mellitus.  3.  Hypertension


POSTOPERATIVE DIAGNOSIS: #1 end-stage renal disease requiring hemodialysis.  2. 

Diabetes mellitus.  3.  Hypertension


OPERATION: 1.  Ultrasound evaluation of the right internal jugular vein.  2.  

Insertion of permacatheter via real-time access of the right internal jugular 

vein.  3.  Angiogram and interpretation.


SURGEON: LENNOX WILLIAMS 1ST ASSISTANT: None.


ANESTHESIA: Moderate Sedation


TISSUE REMOVED OR ALTERED: Not applicable.


COMPLICATIONS: 





None.


ESTIMATED BLOOD LOSS: 5 mils.


INTRAOPERATIVE FINDINGS: Of a satisfactory right internal jugular vein, 

estimated to be 1.2 cm in diameter.  Accepted a PermCath catheter.  The tip of 

the PermCath catheter well down in the right atrium which is somewhat on the 

small side.  Smooth flow of contrast through the right atrium, ventricle and 

pulmonary outflow tract.  Easy egress of blood and ingress of heparinized 

solution through both ports.  Postprocedure x-ray shows normal looking right 

apex, no obvious complication.


PROCEDURE: 








After obtaining informed consent, the patient was taken to the [Cath Lab] and 

positioned supine.  The [right neck] and chest were prepared with chlorhexidine 

and draped out with sterile linen.  After the " universal timeout", in which it 

was verified that the patient continued to receive antibiotic, the procedure 

commenced.  A steriley  sheathed ultrasound probe was used to evaluate the 

[right internal jugular] vein.  Local anesthesia was infiltrated adjacent to the

probe.  Access into the [right internal jugular] vein was obtained using a 

micropuncture needle, followed by micropuncture wire and then a micropuncture 

catheter.  This was followed by introduction of a 0.035 guidewire the tip of 

which was placed down into the inferior vena cava .  A 23 cm long [split 

catheter] was now positioned over the chest and an exit site marked and locally 

anesthetized ,the catheter was placed between the 2 incisions.  Proximally, the 

catheter was  now positioned using a peel-away sheath, after dilation.  Easy 

ingress of heparinized solution and egress of blood obtained through the port . 

A completion angiogram was done by injecting contrast.  The findings were as 

dictated.  The neck incision was now closed using interrupted 3-0 PDS to the 

subcutaneous tissues, the catheter was anchored at the exit site using 3-0 PDS. 

A Biopatch device was now placed adjacent to the catheter.  Dressings were 

applied and the procedure concluded. 














 Copies of the dictated operative report for Dr. Lennox Williams MD.concluded.  

Copies of the dictated operative report for Dr. Lennox Williams MD.

## 2019-03-19 NOTE — OPERATIVE REPORT
Operative Report


DATE OF SURGERY: 03/19/19


PREOPERATIVE DIAGNOSIS: #1 end-stage renal disease on hemodialysis.  2.  PermCa

th in place.  3.  Diabetes mellitus type 2.  4.  Peripheral vascular disease.  

5.  Hypertension.


POSTOPERATIVE DIAGNOSIS: #1 end-stage renal disease on hemodialysis.  2.  Perm

Cath in place.  3.  Diabetes mellitus type 2.  4.  Peripheral vascular disease. 

5.  Hypertension.


OPERATION: Insertion of left high forearm radiocephalic fistula.


SURGEON: LENNOX WILLIAMS 1ST ASSISTANT: None.


ANESTHESIA: Epidural


TISSUE REMOVED OR ALTERED: Not applicable.


COMPLICATIONS: 





None.


ESTIMATED BLOOD LOSS: 20 mL.


INTRAOPERATIVE FINDINGS: Of a satisfactory cephalic vein measuring about 5.2 mm 

at the elbow.  With a convenient deep branch which was utilized for anastomosis.

 The superficial branch was ligated.  Anastomosis done into the first 1.5 cm of 

the radial artery.  The radiologic proved to be somewhat smaller than the ulnar,

probably nondominant.  Satisfactory flow within the radial artery on Doppler 

Doppler and by pulse evaluation after the procedure.  Satisfactory continuous 

Doppler waveform in the fistula at the end of the procedure.  Difficult to 

auscultate, at least partially because of excessive room noise.


PROCEDURE: 


                             


                             Operative Report

















PROCEDURE: After reviewing the procedure with the patient, [she] was taken to 

the operating room.  The patient was sedated and the [left upper extremity] 

prepared with chlorhexidine and draped out with sterile linen.  After the "" 

universal timeout", in which it was verified that the patient [received IV 

antibiotics] the procedure commenced.  


The sterilely sheathed ultrasound probe was used to evaluate the left venous and

arterial systems, pertinent to the previously done vein mapping.   Local 

anesthesia was infiltrated and a transverse incision made over the upper 

forearm, near the antecubital fossa.  Dissection proceeded through the 

subcutaneous tissues down to the cephalic vein.  This was dissected out 

proximally and distally for about 2 cm.  Likewise major branches.  The Bicipital

aponeurosis was now incised longitudinally and the brachial artery and 

bifurcation and radial artery dissected out for a distance of about 1.5 cm.  

Rubber loops were placed on either end of the radial artery.  The patient was  

given 3000 units of heparin intravenously.  The deep branch of the cephalic vein

was transected and irrigated with heparinized solution.  The distal branches 

were clipped  Coronary dilators were accepted [up to 3.5 mm].  The artery was 

controlled proximally and distally with rubber loops.  An arteriotomy approximat

ely [1 cm] in length was made, the artery was irrigated proximally and distally 

with heparinized solution.  The transected vein was now spatulated [using the V 

in both technique], it was then anastomosed end to end to side into the radial 

artery.  This was done using a continuous suture of 6-0 Prolene.  Controls of 

the fistula were now released and it was analyzed using a Doppler probe. 

Hemostasis was secured  once optimal function was assured, the wound was 

irrigated with antibiotic containing solution and closed.  Hemostasis requiring 

introduction of several more Prolene interrupted sutures closure was done using 

interrupted 3-0 PDS for the subcutaneous tissues.  The skin was closed using a 

continuous subcutaneous suture of 4-0 Monocryl which was reinforced with Steri-

Strips over benzoin.  He Kris drain was inserted medially.  I encouraged.  I 

then left the operative field and returned with a stethoscope covered with a 

sterile Tegaderm dressing.  This allowed external auscultation of the fistula.  

Auscultation was difficult to hear.  The procedure was concluded by applying  

dressings over the surgical site.





DICTATING PHYSICIAN:  LENNOX WILLIAMS, M.D.

## 2019-03-26 NOTE — EKG REPORT
SEVERITY:- ABNORMAL ECG -

SINUS RHYTHM

REPOL ABNRM SUGGESTS ISCHEMIA, DIFFUSE LEADS

PROLONGED QT INTERVAL

:

Confirmed by: Rissa De Luna MD 26-Mar-2019 21:51:28

## 2019-03-26 NOTE — RADIOLOGY REPORT (SQ)
EXAM DESCRIPTION:  CHEST SINGLE VIEW



COMPLETED DATE/TIME:  3/26/2019 1:50 pm



REASON FOR STUDY:  POST INTUBATION



COMPARISON:  3/19/2019



EXAM PARAMETERS:  NUMBER OF VIEWS: One view.

TECHNIQUE: Single frontal radiographic view of the chest acquired.

RADIATION DOSE: NA

LIMITATIONS: None.



FINDINGS:  LUNGS AND PLEURA: There is right perihilar airspace disease.  Dialysis catheter and endotr
acheal tube have been added since prior study.  Endotracheal tube lies 3.8 cm above the foreign.

MEDIASTINUM AND HILAR STRUCTURES: No masses.  Contour normal.

HEART AND VASCULAR STRUCTURES: Heart normal in size.  Normal vasculature.

BONES: No acute findings.

HARDWARE: As above.

OTHER: No other significant finding.



IMPRESSION:  Patient has been intubated in a dialysis catheter placed as described.  Minimal right pe
rihilar airspace disease may represent atelectasis.



TECHNICAL DOCUMENTATION:  JOB ID:  [id number]

 2011 [employer]- All Rights Reserved



Reading location - IP/workstation name: [email or web address]

## 2019-03-26 NOTE — DISCHARGE SUMMARY
Discharge Summary (SDC)





- Discharge


Final Diagnosis: 





#1 end-stage renal disease on hemodialysis





2.  Diabetes mellitus type 2.





3.  Tobacco use disorder, reformed.





4.  History of stroke.





5.  Hypertension.


Date of Surgery: 03/26/19


Discharge Date: 03/26/19


Condition: Fair


Treatment or Instructions: 


Discharge home [after recovery per ASU criteria].


Diet , [renal], diabetic,as tolerated, when fully awake advance as tolerated. 


Activities within moderation encouraged.


Follow up in my office by appointment in about [1 week]. Call for appointment.


Leave wounds [covered], [keep clean and dry, until office visit in 1 week].  


Hold of on school/work [until evaluation in office].


Meds per med rec.  Percocet.


May shower [in 48 hrs], [try to keep operated area as dry as possible].


Prescriptions: 


Oxycodone HCl/Acetaminophen [Percocet 5-325 mg Tablet] 1 tab PO ASDIR PRN #15 

tab


 PRN Reason: 


Referrals: 


CATHI FARMER MD [Primary Care Provider] -

## 2019-03-26 NOTE — OPERATIVE REPORT
Operative Report


DATE OF SURGERY: 03/26/19


PREOPERATIVE DIAGNOSIS: #1 end-stage renal disease on hemodialysis.  2.  Diabet

es mellitus type 2.  3.  Tobacco use disorder, reformed.  4.  History of stroke.

 5.  Hypertension.  6.  Umbilical hernia.


POSTOPERATIVE DIAGNOSIS: #1 end-stage renal disease on hemodialysis.  2.  

Diabetes mellitus type 2.  3.  Tobacco use disorder, reformed.  4.  History of 

stroke.  5.  Hypertension.  6.  Umbilical hernia status post repair with mesh.


OPERATION: 1.  Repair of umbilical hernia with mesh, open.  2.  Laparoscopic 

insertion of peritoneal dialysis catheter.


SURGEON: LENNOX WILLIAMS 1ST ASSISTANT: None.


ANESTHESIA: GA


TISSUE REMOVED OR ALTERED: Not applicable.


ESTIMATED BLOOD LOSS: 10 mL.


INTRAOPERATIVE FINDINGS: Of an umbilical hernia defect about 1.5 cm in diameter.

 Satisfactory repair.  With a 6.4 mm mesh.  Satisfactory intraperitoneal 

findings.  Adhesions around the area of the umbilicus noted to the peritoneum.  

Photographs taken.  Coil of the catheter well down in the pelvis.  Not much 

omentum in the pelvic region.  Easy ingress of heparinized solution and likewise

egress.


PROCEDURE: 





After obtaining informed consent and going over the procedure with [the patient 

and his family], he was taken to the operating room, he was anesthetized and 

intubated.  The abdomen was prepped and draped in the usual sterile fashion. 

After the universal timeout, in which it was verified that the patient received 

IV antibiotic, the procedure commenced.





A midline incision was made, curvilinear around the umbilicus about 5 cm in 

length.  This is in reference to the palpation and marking of the abdominal wall

bulge.  Local anesthesia was infiltrated.  Incision was made with a [15 blade 

scalpel].  Dissection now proceeded through the subcutaneous tissue down to the 

hernia sac and umbilical remnant.  The umbilical remnant was transected about 

half a centimeter deep to the skin.  The margins of the hernia were defined 

dissecting with cautery and scissor dissection as needed.  The fascial margins 

were serially grasped with Kocher clamps and elevated.  This facilitated 

[retroperitoneal dissection].  This was done circumferentially for at least [4 

cm, in all directions].  This was facilitated by by moving from each side of the

table so as to get optimal access to the other.  Hemostasis was now secured in 

this space using monopolar cautery.  





The dimensions of the fascial defect were measured out.  It was enlarged 

somewhat to accommodate the mesh.  A mesh was selected of the size, sufficient 

to cover the hernia defect with at least a 3 cm overlap.  The mesh was now 

deployed flat in the preperitoneal space.  It was anchored and kept slightly 

taut.  In  this way circumferential control of the mesh was accomplished.  The 

wound was irrigated with antibiotic containing solution.  .  Fascial closure,  

was now accomplished using a continuous suture of 0 PDS.  This was done with 

bites 5 mm apart and 5 mm away from the fascial edge.  This conforms to best 

practices for reduction of hernia recurrence.  The subcutaneous tissues under it

when it were now irrigated with antibiotic containing solution.  The 

subcutaneous tissues were closed using layers of interrupted 3-0 PDS sutures.  

The skin was closed using a continuous suture of 4-0 Monocryl.  This was 

reinforced with Steri-Strips over benzoin and a sterile dressing applied.  





After obtaining informed consent and going over the procedure with [the patient 

and his family], he was taken to the operating room, [he was] anesthetized and 

intubated.  The abdomen was prepped and draped in the usual sterile fashion. 

After the universal timeout, in which it was verified that the patient received 

IV antibiotic, the procedure commenced.





The topographical location for the peritoneal dialysis catheter was sketched by 

applying it to the anterior abdominal wall.  The reference point was the pubic 

symphysis the coil of the catheter, just beneath this level.  In this way the 

position for the cuffs and the external catheter exit were ascertained and 

marked.  The catheter was now replaced in antibiotic containing solution.  An 

entry into the abdomen was sketched just to the right of the midline and 

transversely in the epigastrium.  Local anesthesia was infiltrated.  A 1 cm, 

transverse incision was made with a [15 blade scalpel].  Dissection now 

proceeded to the medial aspect of the right rectus sheath.  This was opened and 

the muscle gently reflected.  The posterior rectus sheath and peritoneum were 

opened between hemostats and entry was gained to the peritoneal cavity.  This 

allowed introduction of a 5 mm laparoscopic port.  The abdomen was now 

insufflated with carbon dioxide up to a maximum pressure of 12 mm of mercury.  

The camera was inserted and a good view gained of the abdomen.  Photographs were

taken.  Local anesthesia was now infiltrated and an incision made in respect to 

the curve of the catheter.  A 1 cm transverse incision was made at this point 

and dissection proceeded down to the rectus sheath.  This was opened and a 

Veress needle on a reducing sleeve were were now introduced through the rectus 

muscle and the manipulated down to about 4 cm inferior to the incision.  The 

peritoneum was now entered and the Veress needle removed.  The internal cannula 

was now placed under direct vision.  A swan neck peritoneal dialysis catheter 

was now placed on a stylette.  Great care was taken to keep the orientation in 

reference to the white line on the catheter.  It was now inserted into the 

peritoneal cavity under direct vision, through the introducer.  As the catheter 

entered the abdomen the stylette was slowly withdrawn allowing it to assume its 

normal orientation and shape within the peritoneal cavity.  Both the stylet and 

introducer were removed so as to place the internal cuff about 3 cm from the 

entry point of the peritoneal cavity, and within the rectus sheath.  This was 

verified with respect to the incision.  The external curve of the catheter was 

allowed to form precisely at the level of the incision.











 Externally the catheter was affixed to a Dinesh stylette which was now used to 

tunnel the catheter in the subcutaneous tissues to its exit site where it was 

now used to exit the skin.  The catheter orientation and position and, particu

larly the 2 cuffs of the catheter were verified.  Once this was done the 

external portion of the catheter was affixed to a Leur lock adapter and 

connected to a sterile IV tubing.  This allowed introduction of 1 L of 

heparinized saline into the peritoneal cavity via the catheter.  This occurred 

with brisk and free flow of fluid into the peritoneal cavity.  Once the entire 

liter had been infused, the bag was now placed beneath the level of the patient 

and very satisfactory outflow was observed.  


With this in place, the camera and the catheter were removed and abdomen 

desufflated.  The subcutaneous tissue in each incision was closed with 

interrupted 3-0 PDS.  The skin in each incision was closed using interrupted and

continuous sutures of 4-0 Monocryl.  Once about 800 mils of the Infusaid had 

been passively removed from the abdomen, the catheter was flushed with 10 mL of 

heparinized solution and capped.  The  bio a patch was applied at the exit site.

 Benzoin was applied and Steri-Strips used to reinforce each of the wounds.  It 

was also used to help anchor the Biopatch.  It was also used to anchor the main 

catheter so that any external pressure would not dislodge the catheter.  Dry 

gauze and tape applied and the procedure concluded.

## 2019-03-27 NOTE — PDOC CONSULTATION
Consultation


Consult Date: 03/27/19


Attending physician:: CATHI FARMER


Consult reason:: I was asked to see the patient supervised dialysis while here 

in the hospital.





History of Present Illness


Admission Date/PCP: 


  03/26/19 22:30





  CATHI FARMER MD





History of Present Illness: 


CHOCO MONTESINOS JR is a 55 year old male known to me with history of ESRD 

secondary to diabetic nephropathy and hypertensive nephrosclerosis with no 

nephrotic range proteinuria and microscopic hematuria just recently started on 

chronic hemodialysis treatment.  Patient came in yesterday for same day 

umbilical hernia repair with laparoscopic peritoneal dialysis catheter placement

by Dr. Martinez.  Procedure went well but then after he was extubated from the 

procedure patient went into a brief episode of respiratory distress.  He was 

then subsequently admitted for further observation.





This morning I am seeing the patient on dialysis treatment.  He is awake and is 

not in no respiratory distress.  He does not really have any complaints.  He is 

tolerating dialysis well.  His blood pressure is still elevated but actually 

acceptable for him.  He has been complaining about the number of medications he 

has to take.  Other than that does not have any other issues during dialysis 

treatment today.








Past Medical History


Cardiac Medical History: Reports: Hyperlipidemia, Hypertension-primary


Pulmonary Medical History: Reports: Asthma, Chronic Obstructive Pulmonary 

Disease (COPD)


EENT Medical History: Reports: Cataracts


Neurological Medical History: 


   Denies: Seizures


Endocrine Medical History: Reports: Diabetes Mellitus Type 2 - He has diabetic 

retinopathy and nephropathy.  There is proteinuria


Complications of Diabetes: Reports: Autonomic Neuropathy, Nephropathy, 

Retinopathy


Renal/ Medical History: Reports: End Stage Renal Disease, Hematuria, 

Hypocalcemia, Hyperkalemia, Hyperphosphatemia, Proteinuria, Renal Osteodystropy,

Secondary Hyperparathyroidism


Psychiatric Medical History: Reports: Depression


Hematology Medical History: Reports Anemia of Chronic Kidney Disease





Past Surgical History


Past Surgical History: Reports: Orthopedic Surgery, Other - Left leg for plate 

in his ankle, also has plates in his shoulder and hip.





Social History


Information Source: Patient


Lives with: Parents


Smoking Status: Current Every Day Smoker


Frequency of Alcohol Use: None


Hx Recreational Drug Use: No


Drugs: None


Hx Prescription Drug Abuse: No





Family History


Family History: CAD - Father, DM - Paternal grandfather, Hypertension - Father 

and mother


Parental Family History Reviewed: Yes


Children Family History Reviewed: NA


Sibling(s) Family History Reviewed.: Yes





Medication/Allergy


Home Medications: 








Atorvastatin Calcium [Lipitor 40 mg Tablet] 40 mg PO QHS 03/01/19 


Hydralazine HCl [Apresoline 50 mg Tablet] 50 mg PO TID 03/01/19 


Acetaminophen [Tylenol 325 mg Tablet] 650 mg PO Q6HP PRN  tablet 03/14/19 


Oxycodone HCl/Acetaminophen [Percocet 5-325 mg Tablet] 1 tab PO ASDIR PRN #15 

tab 03/19/19 


Oxycodone HCl/Acetaminophen [Percocet 5-325 mg Tablet] 1 tab PO ASDIR PRN #15 

tab 03/26/19 








Allergies/Adverse Reactions: 


                                        





No Known Allergies Allergy (Verified 03/26/19 07:36)


   











Review of Systems


All systems: reviewed and no additional remarkable complaints except as stated


Review of Systems: 





Constitutional: ABSENT: chills, fatigue, fever(s), headache(s), weight gain, 

weight loss


Eyes: ABSENT: visual disturbances


Ears: ABSENT: hearing changes


Cardiovascular: ABSENT: chest pain, dyspnea on exertion, edema, orthropnea, 

palpitations


Respiratory: ABSENT: cough, dyspnea, hemoptysis


Gastrointestinal: ABSENT: abdominal pain, constipation, diarrhea, hematemesis, 

hematochezia, nausea, vomiting


Genitourinary: ABSENT: dysuria, hematuria


Musculoskeletal: ABSENT: joint swelling


Integumentary: ABSENT: rash, wounds


Neurological: ABSENT: abnormal gait, abnormal speech, confusion, dizziness, 

focal weakness, numbness, syncope


Psychiatric: ABSENT: anxiety, depression


Endocrine: ABSENT: cold intolerance, heat intolerance, polydipsia, polyuria


Hematologic/Lymphatic: ABSENT: easy bleeding, easy bruising, lymphadenopathy





Physical Exam


Vital Signs: 


                                        











Temp Pulse Resp BP Pulse Ox


 


 97.5 F   93   20   145/72 H  97 


 


 03/27/19 03:27  03/27/19 07:00  03/27/19 03:27  03/27/19 03:27  03/27/19 03:27








                                 Intake & Output











 03/26/19 03/27/19 03/28/19





 06:59 06:59 06:59


 


Intake Total  1380 


 


Output Total  370 


 


Balance  1010 


 


Weight  74.6 kg 








Vitals during dialysis: Blood pressure 167/90, heart rate of 85, blood flow rate

of 350 mL/min and dialysate flow rate of 800 mL/min.


Exam: 





General appearance: No acute distress, cooperative, well-developed, well-

nourished


Head exam: PRESENT: atraumatic, normocephalic


Eye exam: PRESENT: Conjunctiva slightly pale, EOMI, PERRLA.  ABSENT: conj

unctival injection, scleral icterus


Mouth exam: PRESENT: moist, neck supple, tongue midline


Neck exam: PRESENT: full ROM.  ABSENT: carotid bruit, JVD, lymphadenopathy, 

thyromegaly


Respiratory exam: PRESENT: clear to auscultation bilaterally.  ABSENT: rales, 

rhonchi, stridor, wheezes


Cardiovascular exam: PRESENT: RRR, +S1, +S2.  ABSENT: systolic murmur


Pulses: PRESENT: normal radial pulses, normal dorsalis pedis pulses


GI/Abdominal exam: PRESENT: normal bowel sounds, soft.  ABSENT: guarding, mass, 

tenderness


Rectal exam: Deferred


Extremities exam: PRESENT: full ROM.  ABSENT: calf tenderness, pedal edema


Musculoskeletal: PRESENT: full ROM.  ABSENT: deformity


Neurological exam: PRESENT: alert, Awake, Oriented to person, Oriented to place,

Oriented to time, reflexes normal, CN II-XII grossly intact.  ABSENT: motor 

sensory deficit


Psychiatric exam: PRESENT: appropriate affect, normal mood.  ABSENT: homicidal 

ideation, suicidal ideation


Skin exam: PRESENT: intact, dry, warm.  ABSENT: rash





Results


Laboratory Results: 


                                        





                                 03/27/19 08:10 





                                 03/27/19 08:10 





                                        











  03/26/19 03/27/19 03/27/19





  13:39 08:10 08:10


 


WBC   7.7 


 


RBC   2.86 L 


 


Hgb   8.8 L 


 


Hct   25.9 L 


 


MCV   90 


 


MCH   30.6 


 


MCHC   33.9 


 


RDW   15.6 H 


 


Plt Count   123 L 


 


Sodium  136.9 L   136.2 L


 


Potassium  4.0   3.8


 


Chloride  105   103


 


Carbon Dioxide  25   24


 


Anion Gap  7   9


 


BUN  34 H   42 H


 


Creatinine  5.63 H   7.00 H


 


Est GFR ( Amer)  13 L   10 L


 


Est GFR (Non-Af Amer)  11 L   8 L


 


Glucose  125 H   117 H


 


Calcium  6.6 L*   7.2 L


 


Magnesium  1.3 L  


 


Total Bilirubin  0.4  


 


AST  28  


 


ALT  32  


 


Alkaline Phosphatase  62  


 


Total Protein  4.8 L  


 


Albumin  2.3 L  








                                        











  03/26/19





  13:39


 


Troponin I  1.410


 


NT-Pro-B Natriuret Pep  01694 H











Impressions: 


                                        





Chest X-Ray  03/26/19 00:00


IMPRESSION:  Patient has been intubated in a dialysis catheter placed as 

described.  Minimal right perihilar airspace disease may represent atelectasis.


 














Assessment & Plan





- Diagnosis


(1) End-stage renal disease on hemodialysis


Is this a current diagnosis for this admission?: Yes   


Plan: 


We will do dialysis today for 3 hours, using the patient's right IJ PermCath, 

with 3 potassium bath, blood flow rate of 350 mL per minute, dialysate flow rate

of 800 mL per minute, ultrafiltration 1 L or less as tolerated, no heparin and 

Procrit with 20,000 units during dialysis intravenously or subcutaneously if 

unable to be given during dialysis treatment.  Patient will be monitored 

throughout dialysis treatment by our dialysis nurses.  Patient is very stable 

and from nephrology standpoint can be safely discharged home after dialysis 

treatment today provided there are no more other issue.








(2) Hypertension


Is this a current diagnosis for this admission?: Yes   


Plan: 


Blood pressures acceptable for patient at this point.  Continue the same blood 

pressure regimen.








(3) Anemia in chronic kidney disease


Qualifiers: 


   Chronic kidney disease stage: on chronic dialysis   Qualified Code(s): N18.6 

- End stage renal disease; D63.1 - Anemia in chronic kidney disease; Z99.2 - 

Dependence on renal dialysis   


Is this a current diagnosis for this admission?: Yes   


Plan: 


Give Procrit 20,000 units today.








(4) Respiratory failure, post-operative


Is this a current diagnosis for this admission?: Yes   


Plan: 


Resolved.








(5) Type 2 diabetes mellitus


Qualifiers: 


 


Is this a current diagnosis for this admission?: Yes   





- Notes


Notes: 





Thank you very much for this consultation.  I will follow the patient with you 

if he stays in the hospital.





- Time


Time Spent: 50 to 70 Minutes

## 2019-03-27 NOTE — PDOC PROGRESS REPORT
Subjective


Progress Note for:: 03/27/19


Subjective:: 





The patient is being evaluated today after being admitted for apparent transient

postoperative respiratory failure.


Reason For Visit: 


POST OP RESPIRATORY FAILURE


Need evaluation.





Physical Exam


Vital Signs: 


                                        











Temp Pulse Resp BP Pulse Ox


 


 97.5 F   105 H  20   145/72 H  97 


 


 03/27/19 03:27  03/27/19 03:27  03/27/19 03:27  03/27/19 03:27  03/27/19 03:27








                                 Intake & Output











 03/26/19 03/27/19 03/28/19





 06:59 06:59 06:59


 


Intake Total  1380 


 


Output Total  370 


 


Balance  1010 


 


Weight  74.6 kg 











Additional comments: 





Constitutional: Well-developed well-nourished  gentleman.  No apparent 

acute distress.  Quite cheerful this morning.





Eyes: Mucous membranes pink and moist, pupils equal and reactive to light.  

Conjunctiva normal.  Cornea normal.  Wears spectacles











Respiratory: Normal respiratory effort.  On room air




















Results


Laboratory Results: 


                                        





                                 03/26/19 13:39 





                                        











  03/26/19





  13:39


 


Sodium  136.9 L


 


Potassium  4.0


 


Chloride  105


 


Carbon Dioxide  25


 


Anion Gap  7


 


BUN  34 H


 


Creatinine  5.63 H


 


Est GFR ( Amer)  13 L


 


Est GFR (Non-Af Amer)  11 L


 


Glucose  125 H


 


Calcium  6.6 L*


 


Magnesium  1.3 L


 


Total Bilirubin  0.4


 


AST  28


 


ALT  32


 


Alkaline Phosphatase  62


 


Total Protein  4.8 L


 


Albumin  2.3 L








                                        











  03/26/19





  13:39


 


Troponin I  1.410


 


NT-Pro-B Natriuret Pep  06712 H











Impressions: 


                                        





Chest X-Ray  03/26/19 00:00


IMPRESSION:  Patient has been intubated in a dialysis catheter placed as 

described.  Minimal right perihilar airspace disease may represent atelectasis.


 














Assessment & Plan





- Diagnosis


(1) Respiratory failure, post-operative


Is this a current diagnosis for this admission?: Yes   





(2) Hypertension


Is this a current diagnosis for this admission?: Yes   





(3) Diabetes mellitus


Qualifiers: 


   Diabetes mellitus type: type 2   Diabetes mellitus complication status: with 

other specified complication 


Is this a current diagnosis for this admission?: Yes   





(4) End-stage renal disease on hemodialysis


Is this a current diagnosis for this admission?: Yes   





- Plan Summary


Plan Summary: 





This patient was admitted because he went into respiratory failure in the 

recovery room after open umbilical hernia repair and laparoscopic peritoneal 

dialysis cath insertion.  These were otherwise apparently uneventful.





The patient is sitting upright in dialysis smiling and cheerful.  Says he feels 

fine.





Dr. Vinson has visited him and Dr. Bobo his nephrologist is aware of him 

being in hospital.





The plan will be to reassess the patient later today.  If the respiratory 

failure was truly just transient and possibly as a consequence of suboptimal 

metabolization of anesthetic agents he would be discharged.

## 2019-03-27 NOTE — PDOC DISCHARGE SUMMARY
General





- Admit/Disc Date/PCP


Admission Date/Primary Care Provider: 


  03/26/19 22:30





  CATHI FARMER MD





Discharge Date: 03/27/19





- Discharge Diagnosis


(1) Hypoxemia


Is this a current diagnosis for this admission?: Yes   





(2) End stage renal disease


Is this a current diagnosis for this admission?: Yes   





(3) Type 2 diabetes mellitus


Is this a current diagnosis for this admission?: Yes   





- Additional Information


Discharge Diet: As Tolerated, Cardiac, Diabetic, Other (Comments)


Discharge Activity: Activity As Tolerated, Balance Activity w/Rest, No Lifting 

Over 10 Pounds, No Lifting/Push/Pulling


Prescriptions: 


Oxycodone HCl/Acetaminophen [Percocet 5-325 mg Tablet] 1 tab PO ASDIR PRN #15 

tab


 PRN Reason: 


Home Medications: 








Atorvastatin Calcium [Lipitor 40 mg Tablet] 40 mg PO QHS 03/01/19 


Hydralazine HCl [Apresoline 50 mg Tablet] 50 mg PO TID 03/01/19 


Acetaminophen [Tylenol 325 mg Tablet] 650 mg PO Q6HP PRN  tablet 03/14/19 


Oxycodone HCl/Acetaminophen [Percocet 5-325 mg Tablet] 1 tab PO ASDIR PRN #15 

tab 03/19/19 


Oxycodone HCl/Acetaminophen [Percocet 5-325 mg Tablet] 1 tab PO ASDIR PRN #15 

tab 03/26/19 











History of Present Illness


History of Present Illness: 


CHOCO MONTESINOS JR is a 55 year old male,He came to the hospital for an 

elective procedure, to have same day umbilical hernia repair with laparoscopic 

peritoneal dialysis catheter placement by Dr. Martinez the procedure went well 

without any complications but patient developed sudden hypoxemia based on the 

oxygen saturation he was immediately intubated in PACU . Dr. Martinez called me 

that he wants patient admitted into ICU for management.  But the 

anesthesiologist extubated the patient while he was in the PACU he was then 

transferred to IMCU floor.Patient was seen  by the bedside he has no new 

complaints consultation will be obtained from nephrology for dialysis








Hospital Course


Hospital Course: 





Patient was admitted because of concern for hypoxemia, he was stabilized there 

was no event for 24 hours in the hospital, he was seen by nephrology, Dr. Bobo he underwent hemodialysis today.The significance of the hypoxemia was 

not clear because patient does not of any acute pulmonary pathology, he was not 

in CHF that would explain the hypoxemia.  The hypoxemia was also immediately 

corrected with oxygen via nasal cannula, he did not require oxygen for the last 

many hours since he has  been in the hospital





Physical Exam


Vital Signs: 


                                        











Temp Pulse Resp BP Pulse Ox


 


 98.7 F   90   20   166/79 H  98 


 


 03/27/19 14:41  03/27/19 14:41  03/27/19 14:41  03/27/19 14:41  03/27/19 14:41








                                 Intake & Output











 03/26/19 03/27/19 03/28/19





 06:59 06:59 06:59


 


Intake Total  1380 350


 


Output Total  370 


 


Balance  1010 350


 


Weight  74.6 kg 











General appearance: PRESENT: no acute distress


Eye exam: PRESENT: PERRLA


Respiratory exam: PRESENT: clear to auscultation nacho


Cardiovascular exam: PRESENT: +S1, +S2


GI/Abdominal exam: PRESENT: soft


Neurological exam: PRESENT: alert





Results


Laboratory Results: 


                                        





                                 03/27/19 08:10 





                                 03/27/19 08:10 





                                        











  03/27/19 03/27/19





  08:10 08:10


 


WBC  7.7 


 


RBC  2.86 L 


 


Hgb  8.8 L 


 


Hct  25.9 L 


 


MCV  90 


 


MCH  30.6 


 


MCHC  33.9 


 


RDW  15.6 H 


 


Plt Count  123 L 


 


Sodium   136.2 L


 


Potassium   3.8


 


Chloride   103


 


Carbon Dioxide   24


 


Anion Gap   9


 


BUN   42 H


 


Creatinine   7.00 H


 


Est GFR ( Amer)   10 L


 


Est GFR (Non-Af Amer)   8 L


 


Glucose   117 H


 


Calcium   7.2 L








                                        











  03/26/19





  13:39


 


Troponin I  1.410


 


NT-Pro-B Natriuret Pep  39638 H











Impressions: 


                                        





Chest X-Ray  03/26/19 00:00


IMPRESSION:  Patient has been intubated in a dialysis catheter placed as 

described.  Minimal right perihilar airspace disease may represent atelectasis.


 














Qualifiers





- *


PATIENT BEING DISCHARGED WITH ANY OF THE FOLLOWING DIAGNOSIS: No

## 2019-03-27 NOTE — PDOC H&P
History of Present Illness


Admission Date/PCP: 


  03/26/19 22:30





  CATHI FARMER MD





History of Present Illness: 


CHOCO MONTESINOS JR is a 55 year old male,He came to the hospital for an jennifer

ctive procedure, to have same day umbilical hernia repair with laparoscopic 

peritoneal dialysis catheter placement by Dr. Martinez the procedure went well 

without any complications but patient developed sudden hypoxemia based on the 

oxygen saturation he was immediately intubated in PACU . Dr. Martinez called me 

that he wants patient admitted into ICU for management.  But the 

anesthesiologist extubated the patient while he was in the PACU he was then 

transferred to IMCU floor.Patient was seen  by the bedside he has no new 

complaints consultation will be obtained from nephrology for dialysis








Past Medical History


Cardiac Medical History: Reports: Hyperlipidema, Hypertension


Pulmonary Medical History: Reports: Asthma, Chronic Obstructive Pulmonary 

Disease (COPD)


EENT Medical History: Reports: Cataracts


Endocrine Medical History: Reports: Diabetes Mellitus Type 2 - He has diabetic 

retinopathy and nephropathy.  There is proteinuria


Renal/ Medical History: Reports: End Stage Renal Disease


Psychiatric Medical History: Reports: Depression


Hematology: Reports: Anemia





Past Surgical History


Past Surgical History: Reports: Orthopedic Surgery, Other - Left leg for plate 

in his ankle, also has plates in his shoulder and hip.





Social History


Lives with: Parents


Smoking Status: Current Every Day Smoker


Frequency of Alcohol Use: None


Hx Recreational Drug Use: No


Drugs: None


Hx Prescription Drug Abuse: No





Family History


Family History: CAD, COPD, DM, Hyperlipidemia, Hypertension


Parental Family History Reviewed: Yes


Children Family History Reviewed: Yes


Sibling(s) Family History Reviewed.: Yes





Medication/Allergy


Home Medications: 








Atorvastatin Calcium [Lipitor 40 mg Tablet] 40 mg PO QHS 03/01/19 


Hydralazine HCl [Apresoline 50 mg Tablet] 50 mg PO TID 03/01/19 


Acetaminophen [Tylenol 325 mg Tablet] 650 mg PO Q6HP PRN  tablet 03/14/19 


Oxycodone HCl/Acetaminophen [Percocet 5-325 mg Tablet] 1 tab PO ASDIR PRN #15 

tab 03/19/19 


Oxycodone HCl/Acetaminophen [Percocet 5-325 mg Tablet] 1 tab PO ASDIR PRN #15 

tab 03/26/19 








Allergies/Adverse Reactions: 


                                        





No Known Allergies Allergy (Verified 03/26/19 07:36)


   











Review of Systems


Constitutional: ABSENT: chills, fever(s), headache(s), weight gain, weight loss


Eyes: ABSENT: visual disturbances


Ears: ABSENT: hearing changes


Cardiovascular: ABSENT: chest pain, dyspnea on exertion, edema, orthropnea, 

palpitations


Respiratory: ABSENT: cough, hemoptysis


Gastrointestinal: ABSENT: abdominal pain, constipation, diarrhea, hematemesis, 

hematochezia, nausea, vomiting


Genitourinary: ABSENT: dysuria, hematuria


Musculoskeletal: ABSENT: joint swelling


Integumentary: ABSENT: rash, wounds


Neurological: ABSENT: abnormal gait, abnormal speech, confusion, dizziness, 

focal weakness, syncope


Psychiatric: ABSENT: anxiety, depression, homidical ideation, suicidal ideation


Endocrine: ABSENT: cold intolerance, heat intolerance, menstrual abnormalities, 

polydipsia, polyuria


Hematologic/Lymphatic: ABSENT: easy bleeding, easy bruising, lymphadenopathy





Physical Exam


Vital Signs: 


                                        











Temp Pulse Resp BP Pulse Ox


 


 98.7 F   90   20   166/79 H  98 


 


 03/27/19 14:41  03/27/19 14:41  03/27/19 14:41  03/27/19 14:41  03/27/19 14:41








                                 Intake & Output











 03/26/19 03/27/19 03/28/19





 06:59 06:59 06:59


 


Intake Total  1380 350


 


Output Total  370 


 


Balance  1010 350


 


Weight  74.6 kg 











General appearance: PRESENT: no acute distress, well-developed, well-nourished


Head exam: PRESENT: atraumatic, normocephalic


Eye exam: PRESENT: conjunctiva pink, EOMI, PERRLA.  ABSENT: scleral icterus


Ear exam: PRESENT: normal external ear exam


Mouth exam: PRESENT: moist, tongue midline


Neck exam: PRESENT: full ROM.  ABSENT: carotid bruit, JVD, lymphadenopathy, 

thyromegaly


Cardiovascular exam: PRESENT: RRR.  ABSENT: diastolic murmur, rubs, systolic 

murmur


Pulses: PRESENT: normal dorsalis pedis pul, +2 pedal pulses bilateral


Vascular exam: PRESENT: normal capillary refill


GI/Abdominal exam: PRESENT: normal bowel sounds, soft.  ABSENT: distended, 

guarding, mass, organolmegaly, rebound, tenderness


Rectal exam: PRESENT: deferred


Neurological exam: PRESENT: alert, awake, oriented to person, oriented to place,

oriented to time, oriented to situation, CN II-XII grossly intact.  ABSENT: 

motor sensory deficit


Psychiatric exam: PRESENT: appropriate affect, normal mood.  ABSENT: homicidal 

ideation, suicidal ideation


Skin exam: PRESENT: dry, intact, warm.  ABSENT: cyanosis, rash





Results


Laboratory Results: 


                                        





                                 03/27/19 08:10 





                                 03/27/19 08:10 





                                        











  03/27/19 03/27/19





  08:10 08:10


 


WBC  7.7 


 


RBC  2.86 L 


 


Hgb  8.8 L 


 


Hct  25.9 L 


 


MCV  90 


 


MCH  30.6 


 


MCHC  33.9 


 


RDW  15.6 H 


 


Plt Count  123 L 


 


Sodium   136.2 L


 


Potassium   3.8


 


Chloride   103


 


Carbon Dioxide   24


 


Anion Gap   9


 


BUN   42 H


 


Creatinine   7.00 H


 


Est GFR ( Amer)   10 L


 


Est GFR (Non-Af Amer)   8 L


 


Glucose   117 H


 


Calcium   7.2 L








                                        











  03/26/19





  13:39


 


Troponin I  1.410


 


NT-Pro-B Natriuret Pep  88626 H











Impressions: 


                                        





Chest X-Ray  03/26/19 00:00


IMPRESSION:  Patient has been intubated in a dialysis catheter placed as 

described.  Minimal right perihilar airspace disease may represent atelectasis.


 














Assessment & Plan





- Diagnosis


(1) Hypoxemia


Is this a current diagnosis for this admission?: Yes   





(2) End stage renal disease


Is this a current diagnosis for this admission?: Yes   





(3) Type 2 diabetes mellitus


Qualifiers: 


   Diabetes mellitus long term insulin use: with long term use   Diabetes 

mellitus complication status: with unspecified complications   Qualified 

Code(s): E11.8 - Type 2 diabetes mellitus with unspecified complications; Z79.4 

- Long term (current) use of insulin   


Is this a current diagnosis for this admission?: Yes

## 2019-04-09 NOTE — RADIOLOGY REPORT (SQ)
EXAM DESCRIPTION:  CHEST 2 VIEWS



COMPLETED DATE/TIME:  4/8/2019 6:18 pm



REASON FOR STUDY:  R06.02 SHORTNESS OF BREATH



COMPARISON:  AP chest 3/26/2019, 3/11/2019



EXAM PARAMETERS:  NUMBER OF VIEWS: two views

TECHNIQUE: Digital Frontal and Lateral radiographic views of the chest acquired.

RADIATION DOSE: NA

LIMITATIONS: none



FINDINGS:  LUNGS AND PLEURA: No opacities, masses or pneumothorax. No pleural effusion.

MEDIASTINUM AND HILAR STRUCTURES: No masses or contour abnormalities.

HEART AND VASCULAR STRUCTURES: Heart normal size.  No evidence for failure.

BONES: No acute findings.

HARDWARE: Right-sided jugular central venous dialysis catheter tip in the right atrium.

OTHER: No other significant finding.



IMPRESSION:  No acute infiltrates.

Right jugular central venous dialysis catheter in good positioning.



TECHNICAL DOCUMENTATION:  JOB ID:  [id number]

 2011 [employer]- All Rights Reserved



Reading location - IP/workstation name: [email or web address]

## 2019-08-06 NOTE — [facility] DISCHARGE SUMMARY E
[facility] Discharge Summary



NAME: CHOCO MONTESINOS JR

                                      MRN: [id number]

                                AGE: 55Y

ADMITTED: 08/06/2019           DISCHARGED: 08/06/2019



HOSPITAL COURSE:

The patient is a 55-year-old gentleman who underwent uneventful cataract

extraction with intraocular lens implant, left eye, on 08/06/2019.  He will

be discharged to home.  He was instructed to resume preoperative

medications, take Tylenol as needed for discomfort, to keep his eye

shielded, to use Durezol, Ilevro and Vigamox at 3 p.m. and 8 p.m., and to

follow up in my office in 1 day.





DICTATING PHYSICIAN: HUI DÍAZ M.D.



[id number]              DT: 08/06/2019 2040

PHY#: [id number]        DD: 08/06/2019 1609

ID:   [id number]               JOB#: [id number]       ACCT: [id number]



cc:HUI DÍAZ M.D.

>

## 2019-08-06 NOTE — [facility] OPERATIVE REPORT E
[facility] Operative Report



NAME: CHOCO MONTESINOS JR

                                      MRN: [id number]

                             AGE: 55Y

DATE OF SURGERY: 08/06/2019                   ROOM:



PREOPERATIVE DIAGNOSIS:

CATARACT, LEFT EYE.



POSTOPERATIVE DIAGNOSIS:

CATARACT, LEFT EYE.



OPERATION:

Phacoemulsification with posterior chamber intraocular lens, left eye.



SURGEON:

HUI DÍAZ M.D.



ANESTHESIA:

Topical with MAC.



INDICATIONS FOR SURGERY:

Difficulty driving at night.



PROCEDURE:

The patient was brought to the Operating Room and placed on the operative

table. Following tetracaine drops, topical anesthesia was administered.

This consisted of instrument wipe pledgets soaked in a solution of 4%

Xylocaine mixed with 0.75% Marcaine in a 1:2 ratio. A 2 x 1 cm pledget was

placed in the superior fornix. A 1 x 1 cm pledget was placed in the

inferior fornix. The eye was patched shut for 5 minutes. The patch was

removed. The eye was sterilely prepped and draped in the usual manner. Lid

speculum was placed in the eye. The pledgets were removed. 4-0 black silk

sutures were placed around the superior and the inferior rectus muscles to

be used as traction. A conjunctival peritomy was made at the 10 o'clock

position. Hemostasis was obtained with bipolar cautery. A posterior limbal

groove was created using a crescent knife and dissected anteriorly towards

the cornea. A sharp point blade was used to create a paracentesis site at

the 2 o'clock position. A 2.4 mm keratome was used to enter the anterior

chamber through the groove. Viscoelastic was injected into the anterior

chamber. An anterior capsulotomy was performed using Utrata forceps in a

capsulorrhexis fashion. Hydrodissection and hydrodelineation were

performed. Phacoemulsification was performed in divide-and-conquer

technique. A total of 6.47 CDE phaco time was used.

Following this, the I/A unit was used to remove residual cortex.

Viscoelastic was injected into the capsular bag. Intraocular lens model

ZCB00, 22.0 diopters, serial number [id number] was placed in the capsular

bag. The I/A unit was used to remove residual viscoelastic. The wound was

seen to be watertight under high and low pressure, and no sutures were

placed. The intraocular lens was well centered. The pressure was adjusted

in the eye to normal pressure. The 4-0 black silk sutures and lid speculum

were removed. A drop of Cosopt was placed into the eye at the end of

surgery.  The eye was shielded after Besivance drops were placed. The

patient tolerated the procedure well and was sent to the Recovery Room in

good condition.





DICTATING PHYSICIAN: HUI DÍAZ M.D.















DICTATING PHYSICIAN: HUI DÍAZ M.D.



[id number]              DT: 08/06/2019 2037

PHY#: [id number]        DD: 08/06/2019 1609

ID:   [id number]               JOB#: [id number]       ACCT: [id number]



cc:HUI DÍAZ M.D.

>

## 2019-09-23 NOTE — RADIOLOGY REPORT (SQ)
EXAM DESCRIPTION:  MRI HEAD WITHOUT



COMPLETED DATE/TIME:  9/23/2019 5:11 pm



REASON FOR STUDY:  W10.9XXA FALL (ON) (FROM) UNSPECIFIED STAIRS AND STEPS, INIT ENCNTR W10.9XXA  FALL
 (ON) (FROM) UNSPECIFIED STAIRS AND STEPS, INIT R51  HEADACHE



COMPARISON:  None.



TECHNIQUE:  Multiplanar imaging includes non-contrasted T1, T2, FLAIR, and Diffusion with ADC map seq
uences. Images stored on PACS.



LIMITATIONS:  None.



FINDINGS:  ANATOMY: No anomalies. Normal vascular flow voids. Pituitary fossa normal.

CSF SPACES: Normal in size and contour. No hemorrhage.

CEREBRUM: A few high-signal intensity lesions scattered throughout the white matter on FLAIR imaging 
with distribution suggesting chronic micro-vascular ischemic change.  Sulci and gyri normal in size a
nd contour.  No evidence of hemorrhage, mass or extraaxial fluid collection.

POSTERIOR FOSSA: No signal alteration. No hemorrhage. No edema, masses or mass effect.  Internal jayson
tory canals, cerebello-pontine angles, mastoids normal.

DIFFUSION: Negative for acute or sub-acute infarction.

ORBITS: No masses.  Globes normal.

PARANASAL SINUSES: No fluid levels.  Mucosa normal.

OTHER: No other significant finding.



IMPRESSION:  MINIMAL MICROVASCULAR ISCHEMIC CHANGE.  OTHERWISE NORMAL STUDY.

EVIDENCE OF ACUTE STROKE: NO.



TECHNICAL DOCUMENTATION:  JOB ID:  [id number]

 2011 [employer]- All Rights Reserved



Reading location - IP/workstation name: [email or web address]

## 2019-10-10 NOTE — RADIOLOGY REPORT (SQ)
EXAM DESCRIPTION:  CT CHEST WITHOUT



COMPLETED DATE/TIME:  10/10/2019 1:30 pm



REASON FOR STUDY:  J44.9 CHRONIC OBSTRUCTIVE PULMONARY DISEASE, UNSPECIFIED F17.210  NICOTINE DEPENDE
NCE, CIGARETTES, UNCOMPLICATED J44.9  CHRONIC OBSTRUCTIVE PULMONARY DISEASE, UNSPECIFIED



COMPARISON:  CT angio chest 11/12/2014



TECHNIQUE:  CT scan performed of the chest without intravenous contrast.  Images reviewed with lung, 
soft tissue and bone windows.  Reconstructed coronal and sagittal MPR images reviewed.  All images st
ored on PACS.

All CT scanners at this facility use dose modulation, iterative reconstruction, and/or weight based d
osing when appropriate to reduce radiation dose to as low as reasonably achievable (ALARA).

[facility]: Dose Right  [facility]: CareDose    [facility]: Dose Right    [facility]: Teradose 4D    [facility]: Smart Technologies



RADIATION DOSE:  CT Rad equipment meets quality standard of care and radiation dose reduction techniq
ues were employed. CTDIvol: 9.1 mGy. DLP: 362 mGy-cm. mGy.



LIMITATIONS:  No technical limitations.



FINDINGS:  LUNGS AND PLEURA: No masses, infiltrates, or pneumothorax.  No pleural effusions or pleura
l calcifications.

HILAR AND MEDIASTINAL STRUCTURES: No identified masses or abnormal nodes.  No obvious aneurysm.

HEART AND VASCULAR STRUCTURES: No aneurysm.  No pericardial effusion.  Mild coronary artery calcifica
tion.  Calcified aortic valve.

UPPER ABDOMEN: No significant findings.  Limited exam.

THYROID AND OTHER SOFT TISSUES: No masses.  No adenopathy.

BONES: No significant finding.

HARDWARE: None in the chest.

OTHER: No other significant findings.



IMPRESSION:  NO SIGNIFICANT FINDING ON NON-CONTRASTED CHEST CT.



TECHNICAL DOCUMENTATION:  JOB ID:  [id number]

Quality ID # [id number]: Final reports with documentation of one or more dose reduction techniques (e.g., Au
tomated exposure control, adjustment of the mA and/or kV according to patient size, use of iterative 
reconstruction technique)

 2011 [employer]- All Rights Reserved



Reading location - IP/workstation name: [email or web address]

## 2019-11-19 NOTE — XCELERA REPORT
[address] [address]

                               Tel: [phone]

                               Fax: [phone]



                      Lower Extremity Arterial Evaluation

_______________________________________________________________________________



Name: JAGUAR TRAYLOR, CHOCO RICH, 

MRN: [id number]                                          Age: 55 yrs

Gender: Male                                             : 1964

Patient Status: Outpatient                               Patient Location: [facility]

Account #: [id number]

Study Date: 2019 09:12 AM

                                Accession #: [id number]

_______________________________________________________________________________

Procedure: A color flow and duplex scan of the lower extremity arteries was

performed bilaterally with velocity and waveform anaylsis.

Reason For Study: PVD







Ordering Physician: SARBJIT CASTRO

Performed By: Taryn Avalos

_______________________________________________________________________________

_______________________________________________________________________________





Measurements and Calculations



                                     Right  Left

                     CFA PSV         180.7 116.6 cm/sec

                     Prox PFA PSV   -216.6 -145.8cm/sec

                     Prox SFA PSV   -254.8 127.9 cm/sec

                     Mid SFA PSV    -373.2 -303.3cm/sec

                     Dist SFA PSV   -103.1 -74.3 cm/sec

                     Prox Pop A PSV  89.4   70.3 cm/sec

                     Mid GHAZALA PSV            65.3 cm/sec

                     Dist GHAZALA PSV    32.4   17.5 cm/sec

                     Dist PTA PSV    78.1   88.9 cm/sec

                     Deep Pedis PSV   45.3  -45.2 cm/sec



_______________________________________________________________________________

Right Side Arterial Evaluation

Normal velocity and triphasic waveforms noted from the Common Femoral artery

to the Popliteal.



Biphasic with normal velocity, spectral broadening, in the Posterior Tibial

artery. Biphasic ,low normal velocity, spectral broadening, in the Anterior

tibial.





Ankle Brachial index not ordered.



Left Side Arterial Evaluation

Normal velocity and triphasic waveforms noted from the Common Femoral artery

to the Popliteal.



Biphasic with normal velocity, spectral broadening, in the Posterior Tibial

artery .Monophasic ,low velocity, spectral broadening, in the Anterior tibial.





Ankle Brachial index not ordered.



_______________________________________________________________________________

Interpretation Summary

Moderate hemodynamically significant lesions in the bilateral lower

extremities, on duplex imaging, at rest. Duplex imaging, indicates normal flow

to the Popliteal, significant disease in the infrageniculate are, especially

severe in the Anterior Tibial. Bilaterally.

_______________________________________________________________________________

Electronically signed by:      Lennox Williams      on 2019 04:52 PM



CC: SARBJIT CASTRO

>

Williams, Lennox

## 2019-12-06 NOTE — RADIOLOGY REPORT (SQ)
EXAM DESCRIPTION:  U/S ABDOMEN LIMITED W/O DOP



COMPLETED DATE/TIME:  12/6/2019 11:08 am



REASON FOR STUDY:  RUQ PAIN K81.0  ACUTE CHOLECYSTITIS



COMPARISON:  None.



TECHNIQUE:  Dynamic and static grayscale images acquired of the abdomen and recorded on PACS. Additio
memo selected color Doppler and spectral images recorded.



LIMITATIONS:  None.



FINDINGS:  PANCREAS: No masses.  Visualized pancreatic duct normal caliber.

LIVER: No masses.  Increased echogenicity.

LIVER VASCULATURE: Normal directional flow of the main portal vein and hepatic veins.

GALLBLADDER: No stones. Normal wall thickness. No pericholecystic fluid.

ULTRASOUND-DETECTED DIXON'S SIGN: Negative.

INTRAHEPATIC DUCTS AND COMMON DUCT: CBD and intrahepatic ducts normal caliber. No filling defects.

INFERIOR VENA CAVA: Normal flow.

AORTA: Proximal aorta measures up to 2.6 cm.

RIGHT KIDNEY:  Normal size measuring 10.5 cm. Normal echogenicity. No solid or suspicious masses. No 
hydronephrosis. No calcifications.

PERITONEAL AND RIGHT PLEURAL SPACE: No ascites or effusions.

OTHER: No other significant findings.



IMPRESSION:  1.  No cholelithiasis or evidence of acute cholecystitis.

2.  Proximal aorta measures up to 2.6 cm.  Follow-up recommendations as below.

3.  Likely hepatic steatosis.



COMMENT:   AAA Size:            Follow-up Recommendation

2.6-2.9 cm Every 5 years*____________________________________________________________________________
_________

*Based upon the Society for Vascular Surgery Guidelines: J Vasc Surg. 2009 Oct;50(4 Suppl):S2-49

*For aortas of maximum diameter of 2.6-2.9 cm meeting the criteria for AAA (?1.5 x proximal normal se
gment)



TECHNICAL DOCUMENTATION:  JOB ID:  [id number]

 2011 [employer]- All Rights Reserved



Reading location - IP/workstation name: [email or web address]

## 2025-07-21 NOTE — RADIOLOGY REPORT (SQ)
EXAM DESCRIPTION:  ANKLE LEFT COMPLETE



COMPLETED DATE/TIME:  3/30/2018 11:21 am



REASON FOR STUDY:  fall injury



COMPARISON:  None.



NUMBER OF VIEWS:  Three views.



TECHNIQUE:  AP, lateral, and oblique radiographic images acquired of the left ankle.



LIMITATIONS:  None.



FINDINGS:  MINERALIZATION: Normal.

BONES: There is an oblique fracture of the distal fibula and transverse fracture of the medial malleo
zoraida.  There is no dislocation.

JOINTS: No effusions.

SOFT TISSUES: No soft tissue swelling. No foreign body.

OTHER: No other significant finding.



IMPRESSION:  Bimalleolar fractures.



TECHNICAL DOCUMENTATION:  JOB ID:  [id number]

 2011 [employer]- All Rights Reserved



Reading location - IP/workstation name: [email or web address] [[email or web address]] : abn excursion [Midline] : trachea located in midline position [Normal] : no rashes